# Patient Record
Sex: MALE | Race: WHITE | NOT HISPANIC OR LATINO | Employment: OTHER | URBAN - METROPOLITAN AREA
[De-identification: names, ages, dates, MRNs, and addresses within clinical notes are randomized per-mention and may not be internally consistent; named-entity substitution may affect disease eponyms.]

---

## 2017-01-01 ENCOUNTER — APPOINTMENT (INPATIENT)
Dept: RADIOLOGY | Facility: HOSPITAL | Age: 72
DRG: 356 | End: 2017-01-01
Payer: MEDICARE

## 2017-01-01 PROCEDURE — 71260 CT THORAX DX C+: CPT

## 2017-01-03 ENCOUNTER — APPOINTMENT (INPATIENT)
Dept: RADIOLOGY | Facility: HOSPITAL | Age: 72
DRG: 356 | End: 2017-01-03
Payer: MEDICARE

## 2017-01-03 ENCOUNTER — ANESTHESIA EVENT (INPATIENT)
Dept: GASTROENTEROLOGY | Facility: HOSPITAL | Age: 72
DRG: 356 | End: 2017-01-03
Payer: MEDICARE

## 2017-01-03 ENCOUNTER — ANESTHESIA (INPATIENT)
Dept: GASTROENTEROLOGY | Facility: HOSPITAL | Age: 72
DRG: 356 | End: 2017-01-03
Payer: MEDICARE

## 2017-01-03 PROCEDURE — 74020 HB X-RAY EXAM OF ABDOMEN (COMPLETE, WITH DECUBITUS/ERECT VIEWS): CPT

## 2017-01-03 PROCEDURE — 71020 HB CHEST X-RAY 2VW FRONTAL&LATL: CPT

## 2017-01-03 RX ORDER — EPHEDRINE SULFATE 50 MG/ML
INJECTION, SOLUTION INTRAVENOUS AS NEEDED
Status: DISCONTINUED | OUTPATIENT
Start: 2017-01-03 | End: 2017-01-03 | Stop reason: SURG

## 2017-01-03 RX ORDER — PROPOFOL 10 MG/ML
INJECTION, EMULSION INTRAVENOUS AS NEEDED
Status: DISCONTINUED | OUTPATIENT
Start: 2017-01-03 | End: 2017-01-03 | Stop reason: SURG

## 2017-01-03 RX ORDER — PROPOFOL 10 MG/ML
INJECTION, EMULSION INTRAVENOUS CONTINUOUS PRN
Status: DISCONTINUED | OUTPATIENT
Start: 2017-01-03 | End: 2017-01-03 | Stop reason: SURG

## 2017-01-03 RX ADMIN — PROPOFOL 150 MCG/KG/MIN: 10 INJECTION, EMULSION INTRAVENOUS at 15:55

## 2017-01-03 RX ADMIN — EPHEDRINE SULFATE 20 MG: 50 INJECTION, SOLUTION INTRAMUSCULAR; INTRAVENOUS; SUBCUTANEOUS at 16:52

## 2017-01-03 RX ADMIN — PROPOFOL 80 MG: 10 INJECTION, EMULSION INTRAVENOUS at 15:55

## 2017-01-04 ENCOUNTER — ANESTHESIA EVENT (INPATIENT)
Dept: PERIOP | Facility: HOSPITAL | Age: 72
DRG: 356 | End: 2017-01-04
Payer: MEDICARE

## 2017-01-04 ENCOUNTER — ANESTHESIA EVENT (INPATIENT)
Dept: GASTROENTEROLOGY | Facility: HOSPITAL | Age: 72
DRG: 356 | End: 2017-01-04
Payer: MEDICARE

## 2017-01-04 ENCOUNTER — ANESTHESIA (INPATIENT)
Dept: GASTROENTEROLOGY | Facility: HOSPITAL | Age: 72
DRG: 356 | End: 2017-01-04
Payer: MEDICARE

## 2017-01-04 ENCOUNTER — APPOINTMENT (INPATIENT)
Dept: RADIOLOGY | Facility: HOSPITAL | Age: 72
DRG: 356 | End: 2017-01-04
Payer: MEDICARE

## 2017-01-04 ENCOUNTER — GENERIC CONVERSION - ENCOUNTER (OUTPATIENT)
Dept: OTHER | Facility: OTHER | Age: 72
End: 2017-01-04

## 2017-01-04 PROCEDURE — 74280 X-RAY XM COLON 2CNTRST STD: CPT

## 2017-01-04 RX ORDER — KETAMINE HYDROCHLORIDE 100 MG/ML
INJECTION, SOLUTION INTRAMUSCULAR; INTRAVENOUS AS NEEDED
Status: DISCONTINUED | OUTPATIENT
Start: 2017-01-04 | End: 2017-01-04 | Stop reason: SURG

## 2017-01-04 RX ORDER — PROPOFOL 10 MG/ML
INJECTION, EMULSION INTRAVENOUS AS NEEDED
Status: DISCONTINUED | OUTPATIENT
Start: 2017-01-04 | End: 2017-01-04 | Stop reason: SURG

## 2017-01-04 RX ORDER — GLYCOPYRROLATE 0.2 MG/ML
INJECTION INTRAMUSCULAR; INTRAVENOUS AS NEEDED
Status: DISCONTINUED | OUTPATIENT
Start: 2017-01-04 | End: 2017-01-04 | Stop reason: SURG

## 2017-01-04 RX ADMIN — PROPOFOL 40 MG: 10 INJECTION, EMULSION INTRAVENOUS at 12:57

## 2017-01-04 RX ADMIN — PROPOFOL 30 MG: 10 INJECTION, EMULSION INTRAVENOUS at 12:47

## 2017-01-04 RX ADMIN — PROPOFOL 40 MG: 10 INJECTION, EMULSION INTRAVENOUS at 13:12

## 2017-01-04 RX ADMIN — PROPOFOL 40 MG: 10 INJECTION, EMULSION INTRAVENOUS at 13:06

## 2017-01-04 RX ADMIN — GLYCOPYRROLATE 0.2 MG: 0.2 INJECTION INTRAMUSCULAR; INTRAVENOUS at 12:37

## 2017-01-04 RX ADMIN — KETAMINE HYDROCHLORIDE 10 MG: 100 INJECTION INTRAMUSCULAR; INTRAVENOUS at 12:53

## 2017-01-04 RX ADMIN — PROPOFOL 80 MG: 10 INJECTION, EMULSION INTRAVENOUS at 12:41

## 2017-01-04 RX ADMIN — PROPOFOL 20 MG: 10 INJECTION, EMULSION INTRAVENOUS at 12:45

## 2017-01-04 RX ADMIN — PROPOFOL 30 MG: 10 INJECTION, EMULSION INTRAVENOUS at 12:53

## 2017-01-04 RX ADMIN — KETAMINE HYDROCHLORIDE 20 MG: 100 INJECTION INTRAMUSCULAR; INTRAVENOUS at 12:40

## 2017-01-05 ENCOUNTER — ANESTHESIA (INPATIENT)
Dept: PERIOP | Facility: HOSPITAL | Age: 72
DRG: 356 | End: 2017-01-05
Payer: MEDICARE

## 2017-01-05 RX ORDER — ROCURONIUM BROMIDE 10 MG/ML
INJECTION, SOLUTION INTRAVENOUS AS NEEDED
Status: DISCONTINUED | OUTPATIENT
Start: 2017-01-05 | End: 2017-01-05 | Stop reason: SURG

## 2017-01-05 RX ORDER — SODIUM CHLORIDE 9 MG/ML
INJECTION, SOLUTION INTRAVENOUS CONTINUOUS PRN
Status: DISCONTINUED | OUTPATIENT
Start: 2017-01-05 | End: 2017-01-05 | Stop reason: SURG

## 2017-01-05 RX ORDER — HYDROMORPHONE HYDROCHLORIDE 2 MG/ML
INJECTION, SOLUTION INTRAMUSCULAR; INTRAVENOUS; SUBCUTANEOUS AS NEEDED
Status: DISCONTINUED | OUTPATIENT
Start: 2017-01-05 | End: 2017-01-05 | Stop reason: SURG

## 2017-01-05 RX ORDER — SODIUM CHLORIDE, SODIUM LACTATE, POTASSIUM CHLORIDE, CALCIUM CHLORIDE 600; 310; 30; 20 MG/100ML; MG/100ML; MG/100ML; MG/100ML
INJECTION, SOLUTION INTRAVENOUS CONTINUOUS PRN
Status: DISCONTINUED | OUTPATIENT
Start: 2017-01-05 | End: 2017-01-05

## 2017-01-05 RX ORDER — FENTANYL CITRATE 50 UG/ML
INJECTION, SOLUTION INTRAMUSCULAR; INTRAVENOUS AS NEEDED
Status: DISCONTINUED | OUTPATIENT
Start: 2017-01-05 | End: 2017-01-05 | Stop reason: SURG

## 2017-01-05 RX ORDER — GLYCOPYRROLATE 0.2 MG/ML
INJECTION INTRAMUSCULAR; INTRAVENOUS AS NEEDED
Status: DISCONTINUED | OUTPATIENT
Start: 2017-01-05 | End: 2017-01-05 | Stop reason: SURG

## 2017-01-05 RX ORDER — PROPOFOL 10 MG/ML
INJECTION, EMULSION INTRAVENOUS AS NEEDED
Status: DISCONTINUED | OUTPATIENT
Start: 2017-01-05 | End: 2017-01-05 | Stop reason: SURG

## 2017-01-05 RX ORDER — SUCCINYLCHOLINE CHLORIDE 20 MG/ML
INJECTION INTRAMUSCULAR; INTRAVENOUS AS NEEDED
Status: DISCONTINUED | OUTPATIENT
Start: 2017-01-05 | End: 2017-01-05 | Stop reason: SURG

## 2017-01-05 RX ORDER — ALBUMIN, HUMAN INJ 5% 5 %
SOLUTION INTRAVENOUS CONTINUOUS PRN
Status: DISCONTINUED | OUTPATIENT
Start: 2017-01-05 | End: 2017-01-05 | Stop reason: SURG

## 2017-01-05 RX ORDER — ONDANSETRON 2 MG/ML
INJECTION INTRAMUSCULAR; INTRAVENOUS AS NEEDED
Status: DISCONTINUED | OUTPATIENT
Start: 2017-01-05 | End: 2017-01-05 | Stop reason: SURG

## 2017-01-05 RX ORDER — SODIUM CHLORIDE, SODIUM LACTATE, POTASSIUM CHLORIDE, CALCIUM CHLORIDE 600; 310; 30; 20 MG/100ML; MG/100ML; MG/100ML; MG/100ML
INJECTION, SOLUTION INTRAVENOUS CONTINUOUS PRN
Status: DISCONTINUED | OUTPATIENT
Start: 2017-01-05 | End: 2017-01-05 | Stop reason: SURG

## 2017-01-05 RX ADMIN — ROCURONIUM BROMIDE 50 MG: 10 INJECTION, SOLUTION INTRAVENOUS at 18:55

## 2017-01-05 RX ADMIN — FENTANYL CITRATE 50 MCG: 50 INJECTION, SOLUTION INTRAMUSCULAR; INTRAVENOUS at 19:40

## 2017-01-05 RX ADMIN — ONDANSETRON 4 MG: 2 INJECTION INTRAMUSCULAR; INTRAVENOUS at 21:00

## 2017-01-05 RX ADMIN — HYDROMORPHONE HYDROCHLORIDE 0.5 MG: 2 INJECTION, SOLUTION INTRAMUSCULAR; INTRAVENOUS; SUBCUTANEOUS at 21:25

## 2017-01-05 RX ADMIN — METRONIDAZOLE 500 MG: 500 INJECTION, SOLUTION INTRAVENOUS at 18:55

## 2017-01-05 RX ADMIN — GLYCOPYRROLATE 0.4 MG: 0.2 INJECTION INTRAMUSCULAR; INTRAVENOUS at 21:09

## 2017-01-05 RX ADMIN — KETAMINE HYDROCHLORIDE 0.2 MG/KG/HR: 100 INJECTION INTRAMUSCULAR; INTRAVENOUS at 19:10

## 2017-01-05 RX ADMIN — ROCURONIUM BROMIDE 20 MG: 10 INJECTION, SOLUTION INTRAVENOUS at 20:01

## 2017-01-05 RX ADMIN — ROCURONIUM BROMIDE 20 MG: 10 INJECTION, SOLUTION INTRAVENOUS at 19:28

## 2017-01-05 RX ADMIN — SODIUM CHLORIDE, SODIUM LACTATE, POTASSIUM CHLORIDE, AND CALCIUM CHLORIDE: .6; .31; .03; .02 INJECTION, SOLUTION INTRAVENOUS at 18:30

## 2017-01-05 RX ADMIN — NEOSTIGMINE METHYLSULFATE 3 MG: 1 INJECTION INTRAMUSCULAR; INTRAVENOUS; SUBCUTANEOUS at 21:09

## 2017-01-05 RX ADMIN — SODIUM CHLORIDE: 0.9 INJECTION, SOLUTION INTRAVENOUS at 18:42

## 2017-01-05 RX ADMIN — SUCCINYLCHOLINE CHLORIDE 100 MG: 20 INJECTION, SOLUTION INTRAMUSCULAR; INTRAVENOUS at 18:39

## 2017-01-05 RX ADMIN — ALBUMIN HUMAN: 0.05 INJECTION, SOLUTION INTRAVENOUS at 20:17

## 2017-01-05 RX ADMIN — PROPOFOL 200 MG: 10 INJECTION, EMULSION INTRAVENOUS at 18:39

## 2017-01-05 RX ADMIN — SODIUM CHLORIDE, SODIUM LACTATE, POTASSIUM CHLORIDE, AND CALCIUM CHLORIDE: .6; .31; .03; .02 INJECTION, SOLUTION INTRAVENOUS at 20:16

## 2017-01-05 RX ADMIN — FENTANYL CITRATE 50 MCG: 50 INJECTION, SOLUTION INTRAMUSCULAR; INTRAVENOUS at 18:39

## 2017-01-05 RX ADMIN — CEFAZOLIN SODIUM 2000 MG: 2 SOLUTION INTRAVENOUS at 18:45

## 2017-01-20 ENCOUNTER — ALLSCRIPTS OFFICE VISIT (OUTPATIENT)
Dept: OTHER | Facility: OTHER | Age: 72
End: 2017-01-20

## 2017-01-24 ENCOUNTER — HOSPITAL ENCOUNTER (OUTPATIENT)
Dept: RADIOLOGY | Facility: MEDICAL CENTER | Age: 72
Discharge: HOME | End: 2017-01-24
Payer: MEDICARE

## 2017-01-24 DIAGNOSIS — R60.0 LOCALIZED EDEMA: ICD-10-CM

## 2017-01-24 PROCEDURE — 93970 EXTREMITY STUDY: CPT

## 2017-02-08 ENCOUNTER — ALLSCRIPTS OFFICE VISIT (OUTPATIENT)
Dept: OTHER | Facility: OTHER | Age: 72
End: 2017-02-08

## 2017-02-08 DIAGNOSIS — C80.1 PRIMARY MALIGNANT NEOPLASM (HCC): ICD-10-CM

## 2017-02-08 DIAGNOSIS — M21.371 RIGHT FOOT DROP: ICD-10-CM

## 2017-02-08 DIAGNOSIS — E11.9 TYPE 2 DIABETES MELLITUS WITHOUT COMPLICATIONS (HCC): ICD-10-CM

## 2017-02-08 DIAGNOSIS — N40.0 ENLARGED PROSTATE WITHOUT LOWER URINARY TRACT SYMPTOMS (LUTS): ICD-10-CM

## 2017-02-10 RX ORDER — SENNA AND DOCUSATE SODIUM 50; 8.6 MG/1; MG/1
1 TABLET, FILM COATED ORAL 2 TIMES DAILY
COMMUNITY

## 2017-02-10 RX ORDER — METFORMIN HYDROCHLORIDE EXTENDED-RELEASE TABLETS 1000 MG/1
500 TABLET, FILM COATED, EXTENDED RELEASE ORAL
COMMUNITY

## 2017-02-10 RX ORDER — POLYETHYLENE GLYCOL 3350 17 G/17G
17 POWDER, FOR SOLUTION ORAL DAILY
COMMUNITY

## 2017-02-13 ENCOUNTER — TRANSCRIBE ORDERS (OUTPATIENT)
Dept: ADMINISTRATIVE | Facility: HOSPITAL | Age: 72
End: 2017-02-13

## 2017-02-13 ENCOUNTER — APPOINTMENT (OUTPATIENT)
Dept: LAB | Facility: HOSPITAL | Age: 72
End: 2017-02-13
Attending: INTERNAL MEDICINE
Payer: MEDICARE

## 2017-02-13 DIAGNOSIS — N40.0 ENLARGED PROSTATE WITHOUT LOWER URINARY TRACT SYMPTOMS (LUTS): ICD-10-CM

## 2017-02-13 DIAGNOSIS — E11.9 TYPE 2 DIABETES MELLITUS WITHOUT COMPLICATIONS (HCC): ICD-10-CM

## 2017-02-13 DIAGNOSIS — C80.1 PRIMARY MALIGNANT NEOPLASM (HCC): ICD-10-CM

## 2017-02-13 LAB
ALBUMIN SERPL BCP-MCNC: 3.2 G/DL (ref 3.5–5)
ALP SERPL-CCNC: 100 U/L (ref 46–116)
ALT SERPL W P-5'-P-CCNC: 20 U/L (ref 12–78)
ANION GAP SERPL CALCULATED.3IONS-SCNC: 7 MMOL/L (ref 4–13)
AST SERPL W P-5'-P-CCNC: 8 U/L (ref 5–45)
BASOPHILS # BLD AUTO: 0.03 THOUSANDS/ΜL (ref 0–0.1)
BASOPHILS NFR BLD AUTO: 1 % (ref 0–1)
BILIRUB SERPL-MCNC: 0.9 MG/DL (ref 0.2–1)
BUN SERPL-MCNC: 17 MG/DL (ref 5–25)
CALCIUM SERPL-MCNC: 9.9 MG/DL (ref 8.3–10.1)
CHLORIDE SERPL-SCNC: 103 MMOL/L (ref 100–108)
CO2 SERPL-SCNC: 31 MMOL/L (ref 21–32)
CREAT SERPL-MCNC: 1.08 MG/DL (ref 0.6–1.3)
EOSINOPHIL # BLD AUTO: 0.12 THOUSAND/ΜL (ref 0–0.61)
EOSINOPHIL NFR BLD AUTO: 2 % (ref 0–6)
ERYTHROCYTE [DISTWIDTH] IN BLOOD BY AUTOMATED COUNT: 17.7 % (ref 11.6–15.1)
GFR SERPL CREATININE-BSD FRML MDRD: >60 ML/MIN/1.73SQ M
GLUCOSE SERPL-MCNC: 124 MG/DL (ref 65–140)
HCT VFR BLD AUTO: 41.8 % (ref 36.5–49.3)
HGB BLD-MCNC: 12.9 G/DL (ref 12–17)
LYMPHOCYTES # BLD AUTO: 1.5 THOUSANDS/ΜL (ref 0.6–4.47)
LYMPHOCYTES NFR BLD AUTO: 28 % (ref 14–44)
MCH RBC QN AUTO: 24.8 PG (ref 26.8–34.3)
MCHC RBC AUTO-ENTMCNC: 30.9 G/DL (ref 31.4–37.4)
MCV RBC AUTO: 80 FL (ref 82–98)
MONOCYTES # BLD AUTO: 0.55 THOUSAND/ΜL (ref 0.17–1.22)
MONOCYTES NFR BLD AUTO: 10 % (ref 4–12)
NEUTROPHILS # BLD AUTO: 3.24 THOUSANDS/ΜL (ref 1.85–7.62)
NEUTS SEG NFR BLD AUTO: 59 % (ref 43–75)
NRBC BLD AUTO-RTO: 0 /100 WBCS
PLATELET # BLD AUTO: 260 THOUSANDS/UL (ref 149–390)
PMV BLD AUTO: 9.8 FL (ref 8.9–12.7)
POTASSIUM SERPL-SCNC: 3.8 MMOL/L (ref 3.5–5.3)
PROT SERPL-MCNC: 7.6 G/DL (ref 6.4–8.2)
PSA SERPL-MCNC: 8.2 NG/ML (ref 0–4)
RBC # BLD AUTO: 5.2 MILLION/UL (ref 3.88–5.62)
SODIUM SERPL-SCNC: 141 MMOL/L (ref 136–145)
WBC # BLD AUTO: 5.45 THOUSAND/UL (ref 4.31–10.16)

## 2017-02-13 PROCEDURE — 85025 COMPLETE CBC W/AUTO DIFF WBC: CPT

## 2017-02-13 PROCEDURE — 80053 COMPREHEN METABOLIC PANEL: CPT

## 2017-02-13 PROCEDURE — 36415 COLL VENOUS BLD VENIPUNCTURE: CPT

## 2017-02-13 PROCEDURE — 84153 ASSAY OF PSA TOTAL: CPT

## 2017-02-13 PROCEDURE — 86301 IMMUNOASSAY TUMOR CA 19-9: CPT

## 2017-02-14 ENCOUNTER — APPOINTMENT (OUTPATIENT)
Dept: RADIOLOGY | Facility: HOSPITAL | Age: 72
End: 2017-02-14
Attending: COLON & RECTAL SURGERY
Payer: MEDICARE

## 2017-02-14 ENCOUNTER — APPOINTMENT (OUTPATIENT)
Dept: RADIOLOGY | Facility: HOSPITAL | Age: 72
End: 2017-02-14
Payer: MEDICARE

## 2017-02-14 ENCOUNTER — ANESTHESIA (OUTPATIENT)
Dept: PERIOP | Facility: HOSPITAL | Age: 72
End: 2017-02-14
Payer: MEDICARE

## 2017-02-14 ENCOUNTER — HOSPITAL ENCOUNTER (OUTPATIENT)
Facility: HOSPITAL | Age: 72
Setting detail: OUTPATIENT SURGERY
Discharge: HOME | End: 2017-02-14
Attending: COLON & RECTAL SURGERY | Admitting: COLON & RECTAL SURGERY
Payer: MEDICARE

## 2017-02-14 ENCOUNTER — ANESTHESIA EVENT (OUTPATIENT)
Dept: PERIOP | Facility: HOSPITAL | Age: 72
End: 2017-02-14
Payer: MEDICARE

## 2017-02-14 VITALS
TEMPERATURE: 98.4 F | DIASTOLIC BLOOD PRESSURE: 75 MMHG | OXYGEN SATURATION: 93 % | HEART RATE: 53 BPM | HEIGHT: 75 IN | WEIGHT: 240 LBS | RESPIRATION RATE: 16 BRPM | SYSTOLIC BLOOD PRESSURE: 112 MMHG | BODY MASS INDEX: 29.84 KG/M2

## 2017-02-14 DIAGNOSIS — K63.89 COLONIC MASS: Primary | ICD-10-CM

## 2017-02-14 LAB
BACTERIA UR QL AUTO: ABNORMAL /HPF
BILIRUB UR QL STRIP: ABNORMAL
CANCER AG19-9 SERPL-ACNC: 10 U/ML (ref 0–35)
CAOX CRY URNS QL MICRO: ABNORMAL /HPF
CLARITY UR: ABNORMAL
COLOR UR: ABNORMAL
GLUCOSE SERPL-MCNC: 122 MG/DL (ref 65–140)
GLUCOSE SERPL-MCNC: 135 MG/DL (ref 65–140)
GLUCOSE UR STRIP-MCNC: NEGATIVE MG/DL
HGB UR QL STRIP.AUTO: NEGATIVE
HYALINE CASTS #/AREA URNS LPF: ABNORMAL /LPF
KETONES UR STRIP-MCNC: NEGATIVE MG/DL
LEUKOCYTE ESTERASE UR QL STRIP: ABNORMAL
NITRITE UR QL STRIP: NEGATIVE
NON-SQ EPI CELLS URNS QL MICRO: ABNORMAL /HPF
PH UR STRIP.AUTO: 6 [PH] (ref 4.5–8)
PROT UR STRIP-MCNC: ABNORMAL MG/DL
RBC #/AREA URNS AUTO: ABNORMAL /HPF
SP GR UR STRIP.AUTO: 1.02 (ref 1–1.03)
UROBILINOGEN UR QL STRIP.AUTO: 2 E.U./DL
WBC #/AREA URNS AUTO: ABNORMAL /HPF

## 2017-02-14 PROCEDURE — 81001 URINALYSIS AUTO W/SCOPE: CPT | Performed by: COLON & RECTAL SURGERY

## 2017-02-14 PROCEDURE — 77001 FLUOROGUIDE FOR VEIN DEVICE: CPT

## 2017-02-14 PROCEDURE — 82948 REAGENT STRIP/BLOOD GLUCOSE: CPT

## 2017-02-14 PROCEDURE — C1788 PORT, INDWELLING, IMP: HCPCS | Performed by: COLON & RECTAL SURGERY

## 2017-02-14 PROCEDURE — 71010 HB CHEST X-RAY 1 VIEW FRONTAL (PORTABLE): CPT

## 2017-02-14 DEVICE — PORT HIGH PROFILE 8FR KIT PRO-FUSE: Type: IMPLANTABLE DEVICE | Site: CHEST | Status: FUNCTIONAL

## 2017-02-14 RX ORDER — FENTANYL CITRATE/PF 50 MCG/ML
25 SYRINGE (ML) INJECTION
Status: DISCONTINUED | OUTPATIENT
Start: 2017-02-14 | End: 2017-02-14 | Stop reason: HOSPADM

## 2017-02-14 RX ORDER — ATROPINE SULFATE 1 MG/ML
0.25 INJECTION, SOLUTION INTRAMUSCULAR; INTRAVENOUS; SUBCUTANEOUS ONCE
Status: COMPLETED | OUTPATIENT
Start: 2017-02-15 | End: 2017-02-15

## 2017-02-14 RX ORDER — FENTANYL CITRATE 50 UG/ML
INJECTION, SOLUTION INTRAMUSCULAR; INTRAVENOUS AS NEEDED
Status: DISCONTINUED | OUTPATIENT
Start: 2017-02-14 | End: 2017-02-14 | Stop reason: SURG

## 2017-02-14 RX ORDER — ONDANSETRON 2 MG/ML
4 INJECTION INTRAMUSCULAR; INTRAVENOUS EVERY 4 HOURS PRN
Status: DISCONTINUED | OUTPATIENT
Start: 2017-02-14 | End: 2017-02-14 | Stop reason: HOSPADM

## 2017-02-14 RX ORDER — LIDOCAINE HYDROCHLORIDE 10 MG/ML
INJECTION, SOLUTION INFILTRATION; PERINEURAL AS NEEDED
Status: DISCONTINUED | OUTPATIENT
Start: 2017-02-14 | End: 2017-02-14 | Stop reason: HOSPADM

## 2017-02-14 RX ORDER — SODIUM CHLORIDE, SODIUM LACTATE, POTASSIUM CHLORIDE, CALCIUM CHLORIDE 600; 310; 30; 20 MG/100ML; MG/100ML; MG/100ML; MG/100ML
50 INJECTION, SOLUTION INTRAVENOUS CONTINUOUS
Status: DISCONTINUED | OUTPATIENT
Start: 2017-02-14 | End: 2017-02-14 | Stop reason: HOSPADM

## 2017-02-14 RX ORDER — PROPOFOL 10 MG/ML
INJECTION, EMULSION INTRAVENOUS CONTINUOUS PRN
Status: DISCONTINUED | OUTPATIENT
Start: 2017-02-14 | End: 2017-02-14 | Stop reason: SURG

## 2017-02-14 RX ADMIN — FENTANYL CITRATE 25 MCG: 50 INJECTION, SOLUTION INTRAMUSCULAR; INTRAVENOUS at 08:26

## 2017-02-14 RX ADMIN — FENTANYL CITRATE 50 MCG: 50 INJECTION, SOLUTION INTRAMUSCULAR; INTRAVENOUS at 07:33

## 2017-02-14 RX ADMIN — PROPOFOL 100 MCG/KG/MIN: 10 INJECTION, EMULSION INTRAVENOUS at 07:34

## 2017-02-14 RX ADMIN — SODIUM CHLORIDE, SODIUM LACTATE, POTASSIUM CHLORIDE, AND CALCIUM CHLORIDE: .6; .31; .03; .02 INJECTION, SOLUTION INTRAVENOUS at 07:30

## 2017-02-14 RX ADMIN — FENTANYL CITRATE 25 MCG: 50 INJECTION, SOLUTION INTRAMUSCULAR; INTRAVENOUS at 08:11

## 2017-02-14 RX ADMIN — CEFAZOLIN SODIUM 2000 MG: 2 SOLUTION INTRAVENOUS at 07:37

## 2017-02-14 RX ADMIN — METOPROLOL TARTRATE 1 MG: 5 INJECTION, SOLUTION INTRAVENOUS at 08:30

## 2017-02-15 ENCOUNTER — GENERIC CONVERSION - ENCOUNTER (OUTPATIENT)
Dept: OTHER | Facility: OTHER | Age: 72
End: 2017-02-15

## 2017-02-15 ENCOUNTER — HOSPITAL ENCOUNTER (OUTPATIENT)
Dept: INFUSION CENTER | Facility: HOSPITAL | Age: 72
Discharge: HOME | End: 2017-02-15
Payer: MEDICARE

## 2017-02-15 VITALS
HEIGHT: 75 IN | DIASTOLIC BLOOD PRESSURE: 75 MMHG | SYSTOLIC BLOOD PRESSURE: 131 MMHG | WEIGHT: 228.4 LBS | TEMPERATURE: 97.2 F | HEART RATE: 72 BPM | BODY MASS INDEX: 28.4 KG/M2 | RESPIRATION RATE: 18 BRPM

## 2017-02-15 PROCEDURE — 96413 CHEMO IV INFUSION 1 HR: CPT

## 2017-02-15 PROCEDURE — 96415 CHEMO IV INFUSION ADDL HR: CPT

## 2017-02-15 PROCEDURE — G0498 CHEMO EXTEND IV INFUS W/PUMP: HCPCS

## 2017-02-15 PROCEDURE — 96375 TX/PRO/DX INJ NEW DRUG ADDON: CPT

## 2017-02-15 PROCEDURE — 96367 TX/PROPH/DG ADDL SEQ IV INF: CPT

## 2017-02-15 RX ORDER — OMEPRAZOLE 20 MG/1
20 CAPSULE, DELAYED RELEASE ORAL DAILY
COMMUNITY

## 2017-02-15 RX ADMIN — DEXAMETHASONE SODIUM PHOSPHATE: 10 INJECTION, SOLUTION INTRAMUSCULAR; INTRAVENOUS at 08:13

## 2017-02-15 RX ADMIN — IRINOTECAN HYDROCHLORIDE 330 MG: 100 INJECTION, SOLUTION INTRAVENOUS at 08:54

## 2017-02-15 RX ADMIN — ATROPINE SULFATE 0.25 MG: 1 INJECTION, SOLUTION INTRAMUSCULAR; INTRAVENOUS; SUBCUTANEOUS at 08:55

## 2017-02-15 NOTE — PLAN OF CARE
Problem: Potential for Falls  Goal: Patient will remain free of falls  INTERVENTIONS:  - Assess patient frequently for physical needs  - Identify cognitive and physical deficits and behaviors that affect risk of falls    - [place] fall precautions as indicated by assessment   - Educate patient/family on patient safety including physical limitations  - Instruct patient to call for assistance with activity based on assessment  - Modify environment to reduce risk of injury  - Consider OT/PT consult to assist with strengthening/mobility   Outcome: Progressing

## 2017-02-15 NOTE — SOCIAL WORK
LSW reviewed pts distress thermometer completed by pt on 2/15/2017 in [facility]  Pt rated their distress a 4/10 and denied psychosocial problems  Based on pts score, a social work follow up would not be indicated  If pt expresses psychosocial needs, LSW is available to provide cancer care counseling

## 2017-02-17 ENCOUNTER — HOSPITAL ENCOUNTER (OUTPATIENT)
Dept: INFUSION CENTER | Facility: HOSPITAL | Age: 72
Discharge: HOME | End: 2017-02-17
Payer: MEDICARE

## 2017-02-17 RX ADMIN — Medication 300 UNITS: at 09:12

## 2017-02-21 ENCOUNTER — GENERIC CONVERSION - ENCOUNTER (OUTPATIENT)
Dept: OTHER | Facility: OTHER | Age: 72
End: 2017-02-21

## 2017-02-22 ENCOUNTER — GENERIC CONVERSION - ENCOUNTER (OUTPATIENT)
Dept: OTHER | Facility: OTHER | Age: 72
End: 2017-02-22

## 2017-02-22 ENCOUNTER — ALLSCRIPTS OFFICE VISIT (OUTPATIENT)
Dept: OTHER | Facility: OTHER | Age: 72
End: 2017-02-22

## 2017-02-22 ENCOUNTER — TRANSCRIBE ORDERS (OUTPATIENT)
Dept: ADMINISTRATIVE | Facility: HOSPITAL | Age: 72
End: 2017-02-22

## 2017-02-22 DIAGNOSIS — C80.1 OTHER MALIGNANT NEOPLASM WITHOUT SPECIFICATION OF SITE: Primary | ICD-10-CM

## 2017-02-27 ENCOUNTER — APPOINTMENT (OUTPATIENT)
Dept: LAB | Facility: HOSPITAL | Age: 72
End: 2017-02-27
Attending: INTERNAL MEDICINE
Payer: MEDICARE

## 2017-02-27 DIAGNOSIS — C80.1 PRIMARY MALIGNANT NEOPLASM (HCC): ICD-10-CM

## 2017-02-27 LAB
ALBUMIN SERPL BCP-MCNC: 2.8 G/DL (ref 3.5–5)
ALP SERPL-CCNC: 91 U/L (ref 46–116)
ALT SERPL W P-5'-P-CCNC: 24 U/L (ref 12–78)
ANION GAP SERPL CALCULATED.3IONS-SCNC: 4 MMOL/L (ref 4–13)
AST SERPL W P-5'-P-CCNC: 14 U/L (ref 5–45)
BASOPHILS # BLD AUTO: 0.03 THOUSANDS/ΜL (ref 0–0.1)
BASOPHILS NFR BLD AUTO: 1 % (ref 0–1)
BILIRUB SERPL-MCNC: 0.5 MG/DL (ref 0.2–1)
BUN SERPL-MCNC: 13 MG/DL (ref 5–25)
CALCIUM SERPL-MCNC: 9.7 MG/DL (ref 8.3–10.1)
CHLORIDE SERPL-SCNC: 104 MMOL/L (ref 100–108)
CO2 SERPL-SCNC: 32 MMOL/L (ref 21–32)
CREAT SERPL-MCNC: 0.95 MG/DL (ref 0.6–1.3)
EOSINOPHIL # BLD AUTO: 0.11 THOUSAND/ΜL (ref 0–0.61)
EOSINOPHIL NFR BLD AUTO: 2 % (ref 0–6)
ERYTHROCYTE [DISTWIDTH] IN BLOOD BY AUTOMATED COUNT: 17.2 % (ref 11.6–15.1)
GFR SERPL CREATININE-BSD FRML MDRD: >60 ML/MIN/1.73SQ M
GLUCOSE SERPL-MCNC: 117 MG/DL (ref 65–140)
HCT VFR BLD AUTO: 41.5 % (ref 36.5–49.3)
HGB BLD-MCNC: 13.1 G/DL (ref 12–17)
LYMPHOCYTES # BLD AUTO: 1.34 THOUSANDS/ΜL (ref 0.6–4.47)
LYMPHOCYTES NFR BLD AUTO: 27 % (ref 14–44)
MCH RBC QN AUTO: 25.5 PG (ref 26.8–34.3)
MCHC RBC AUTO-ENTMCNC: 31.6 G/DL (ref 31.4–37.4)
MCV RBC AUTO: 81 FL (ref 82–98)
MONOCYTES # BLD AUTO: 0.4 THOUSAND/ΜL (ref 0.17–1.22)
MONOCYTES NFR BLD AUTO: 8 % (ref 4–12)
NEUTROPHILS # BLD AUTO: 3.16 THOUSANDS/ΜL (ref 1.85–7.62)
NEUTS SEG NFR BLD AUTO: 63 % (ref 43–75)
NRBC BLD AUTO-RTO: 0 /100 WBCS
PLATELET # BLD AUTO: 257 THOUSANDS/UL (ref 149–390)
PMV BLD AUTO: 9.6 FL (ref 8.9–12.7)
POTASSIUM SERPL-SCNC: 3.8 MMOL/L (ref 3.5–5.3)
PROT SERPL-MCNC: 7 G/DL (ref 6.4–8.2)
RBC # BLD AUTO: 5.14 MILLION/UL (ref 3.88–5.62)
SODIUM SERPL-SCNC: 140 MMOL/L (ref 136–145)
WBC # BLD AUTO: 5.05 THOUSAND/UL (ref 4.31–10.16)

## 2017-02-27 PROCEDURE — 36415 COLL VENOUS BLD VENIPUNCTURE: CPT

## 2017-02-27 PROCEDURE — 80053 COMPREHEN METABOLIC PANEL: CPT

## 2017-02-27 PROCEDURE — 85025 COMPLETE CBC W/AUTO DIFF WBC: CPT

## 2017-02-28 RX ORDER — ATROPINE SULFATE 1 MG/ML
0.25 INJECTION, SOLUTION INTRAMUSCULAR; INTRAVENOUS; SUBCUTANEOUS ONCE
Status: COMPLETED | OUTPATIENT
Start: 2017-03-01 | End: 2017-03-01

## 2017-03-01 ENCOUNTER — HOSPITAL ENCOUNTER (OUTPATIENT)
Dept: INFUSION CENTER | Facility: HOSPITAL | Age: 72
Discharge: HOME | End: 2017-03-01
Payer: MEDICARE

## 2017-03-01 VITALS
HEIGHT: 75 IN | DIASTOLIC BLOOD PRESSURE: 68 MMHG | SYSTOLIC BLOOD PRESSURE: 109 MMHG | BODY MASS INDEX: 26.75 KG/M2 | TEMPERATURE: 97.6 F | RESPIRATION RATE: 20 BRPM | WEIGHT: 215.17 LBS | HEART RATE: 62 BPM

## 2017-03-01 PROCEDURE — 96375 TX/PRO/DX INJ NEW DRUG ADDON: CPT

## 2017-03-01 PROCEDURE — 96367 TX/PROPH/DG ADDL SEQ IV INF: CPT

## 2017-03-01 PROCEDURE — 96413 CHEMO IV INFUSION 1 HR: CPT

## 2017-03-01 PROCEDURE — G0498 CHEMO EXTEND IV INFUS W/PUMP: HCPCS

## 2017-03-01 PROCEDURE — 96415 CHEMO IV INFUSION ADDL HR: CPT

## 2017-03-01 RX ADMIN — IRINOTECAN HYDROCHLORIDE 330 MG: 100 INJECTION, SOLUTION INTRAVENOUS at 14:01

## 2017-03-01 RX ADMIN — DEXAMETHASONE SODIUM PHOSPHATE: 10 INJECTION, SOLUTION INTRAMUSCULAR; INTRAVENOUS at 13:05

## 2017-03-01 RX ADMIN — ATROPINE SULFATE 0.25 MG: 1 INJECTION, SOLUTION INTRAMUSCULAR; INTRAVENOUS; SUBCUTANEOUS at 14:00

## 2017-03-03 ENCOUNTER — HOSPITAL ENCOUNTER (OUTPATIENT)
Dept: INFUSION CENTER | Facility: HOSPITAL | Age: 72
Discharge: HOME | End: 2017-03-03
Payer: MEDICARE

## 2017-03-03 VITALS — DIASTOLIC BLOOD PRESSURE: 66 MMHG | SYSTOLIC BLOOD PRESSURE: 100 MMHG

## 2017-03-03 RX ADMIN — Medication 300 UNITS: at 13:46

## 2017-03-03 NOTE — PROGRESS NOTES
Pt complained of intermittant dizzyness that he feels is related to his bp meds with he independantly adjusts  /60    Advised pt to follow up with his family dr

## 2017-03-03 NOTE — PLAN OF CARE
Problem: Potential for Falls  Goal: Patient will remain free of falls  INTERVENTIONS:  - Assess patient frequently for physical needs  - Identify cognitive and physical deficits and behaviors that affect risk of falls    - [place] fall precautions as indicated by assessment   - Educate patient/family on patient safety including physical limitations  - Instruct patient to call for assistance with activity based on assessment  - Modify environment to reduce risk of injury  - Consider OT/PT consult to assist with strengthening/mobility   Outcome: Progressing

## 2017-03-08 ENCOUNTER — HOSPITAL ENCOUNTER (OUTPATIENT)
Dept: MRI IMAGING | Facility: HOSPITAL | Age: 72
Discharge: HOME | End: 2017-03-08
Payer: MEDICARE

## 2017-03-08 DIAGNOSIS — C80.1 OTHER MALIGNANT NEOPLASM WITHOUT SPECIFICATION OF SITE: ICD-10-CM

## 2017-03-08 PROCEDURE — 72158 MRI LUMBAR SPINE W/O & W/DYE: CPT

## 2017-03-08 PROCEDURE — A9585 GADOBUTROL INJECTION: HCPCS | Performed by: RADIOLOGY

## 2017-03-08 RX ADMIN — GADOBUTROL 10 ML: 604.72 INJECTION INTRAVENOUS at 10:29

## 2017-03-13 ENCOUNTER — TRANSCRIBE ORDERS (OUTPATIENT)
Dept: LAB | Facility: HOSPITAL | Age: 72
End: 2017-03-13

## 2017-03-13 DIAGNOSIS — R60.0 LOCALIZED EDEMA: ICD-10-CM

## 2017-03-13 DIAGNOSIS — C80.1 PRIMARY MALIGNANT NEOPLASM (HCC): ICD-10-CM

## 2017-03-13 DIAGNOSIS — C80.1 OTHER MALIGNANT NEOPLASM WITHOUT SPECIFICATION OF SITE: Primary | ICD-10-CM

## 2017-03-15 ENCOUNTER — HOSPITAL ENCOUNTER (OUTPATIENT)
Dept: INFUSION CENTER | Facility: HOSPITAL | Age: 72
Discharge: HOME | End: 2017-03-15
Payer: MEDICARE

## 2017-03-15 RX ORDER — MAGNESIUM SULFATE HEPTAHYDRATE 40 MG/ML
2 INJECTION, SOLUTION INTRAVENOUS ONCE
Status: COMPLETED | OUTPATIENT
Start: 2017-03-16 | End: 2017-03-16

## 2017-03-15 RX ORDER — ATROPINE SULFATE 1 MG/ML
0.25 INJECTION, SOLUTION INTRAMUSCULAR; INTRAVENOUS; SUBCUTANEOUS ONCE
Status: DISCONTINUED | OUTPATIENT
Start: 2017-03-15 | End: 2017-03-16

## 2017-03-16 ENCOUNTER — HOSPITAL ENCOUNTER (OUTPATIENT)
Dept: INFUSION CENTER | Facility: HOSPITAL | Age: 72
Discharge: HOME | End: 2017-03-16
Payer: MEDICARE

## 2017-03-16 VITALS
TEMPERATURE: 97 F | DIASTOLIC BLOOD PRESSURE: 85 MMHG | RESPIRATION RATE: 18 BRPM | SYSTOLIC BLOOD PRESSURE: 121 MMHG | HEIGHT: 75 IN | BODY MASS INDEX: 26.45 KG/M2 | WEIGHT: 212.74 LBS | HEART RATE: 98 BPM

## 2017-03-16 LAB
ALBUMIN SERPL BCP-MCNC: 3.1 G/DL (ref 3.5–5)
ALP SERPL-CCNC: 100 U/L (ref 46–116)
ALT SERPL W P-5'-P-CCNC: 30 U/L (ref 12–78)
ANION GAP SERPL CALCULATED.3IONS-SCNC: 8 MMOL/L (ref 4–13)
AST SERPL W P-5'-P-CCNC: 12 U/L (ref 5–45)
BASOPHILS # BLD AUTO: 0.02 THOUSANDS/ΜL (ref 0–0.1)
BASOPHILS NFR BLD AUTO: 1 % (ref 0–1)
BILIRUB SERPL-MCNC: 0.64 MG/DL (ref 0.2–1)
BUN SERPL-MCNC: 20 MG/DL (ref 5–25)
CALCIUM SERPL-MCNC: 10.2 MG/DL (ref 8.3–10.1)
CHLORIDE SERPL-SCNC: 104 MMOL/L (ref 100–108)
CO2 SERPL-SCNC: 28 MMOL/L (ref 21–32)
CREAT SERPL-MCNC: 1.03 MG/DL (ref 0.6–1.3)
EOSINOPHIL # BLD AUTO: 0.03 THOUSAND/ΜL (ref 0–0.61)
EOSINOPHIL NFR BLD AUTO: 1 % (ref 0–6)
ERYTHROCYTE [DISTWIDTH] IN BLOOD BY AUTOMATED COUNT: 17.4 % (ref 11.6–15.1)
GFR SERPL CREATININE-BSD FRML MDRD: >60 ML/MIN/1.73SQ M
GLUCOSE P FAST SERPL-MCNC: 128 MG/DL (ref 65–99)
GLUCOSE SERPL-MCNC: 128 MG/DL (ref 65–140)
HCT VFR BLD AUTO: 39.9 % (ref 36.5–49.3)
HGB BLD-MCNC: 13.2 G/DL (ref 12–17)
LYMPHOCYTES # BLD AUTO: 1.1 THOUSANDS/ΜL (ref 0.6–4.47)
LYMPHOCYTES NFR BLD AUTO: 27 % (ref 14–44)
MCH RBC QN AUTO: 26.2 PG (ref 26.8–34.3)
MCHC RBC AUTO-ENTMCNC: 33.1 G/DL (ref 31.4–37.4)
MCV RBC AUTO: 79 FL (ref 82–98)
MONOCYTES # BLD AUTO: 0.37 THOUSAND/ΜL (ref 0.17–1.22)
MONOCYTES NFR BLD AUTO: 9 % (ref 4–12)
NEUTROPHILS # BLD AUTO: 2.61 THOUSANDS/ΜL (ref 1.85–7.62)
NEUTS SEG NFR BLD AUTO: 62 % (ref 43–75)
NRBC BLD AUTO-RTO: 0 /100 WBCS
PLATELET # BLD AUTO: 289 THOUSANDS/UL (ref 149–390)
PMV BLD AUTO: 10 FL (ref 8.9–12.7)
POTASSIUM SERPL-SCNC: 3.7 MMOL/L (ref 3.5–5.3)
PROT SERPL-MCNC: 7.3 G/DL (ref 6.4–8.2)
RBC # BLD AUTO: 5.03 MILLION/UL (ref 3.88–5.62)
SODIUM SERPL-SCNC: 140 MMOL/L (ref 136–145)
WBC # BLD AUTO: 4.13 THOUSAND/UL (ref 4.31–10.16)

## 2017-03-16 PROCEDURE — 96413 CHEMO IV INFUSION 1 HR: CPT

## 2017-03-16 PROCEDURE — 80053 COMPREHEN METABOLIC PANEL: CPT | Performed by: INTERNAL MEDICINE

## 2017-03-16 PROCEDURE — 96367 TX/PROPH/DG ADDL SEQ IV INF: CPT

## 2017-03-16 PROCEDURE — 96366 THER/PROPH/DIAG IV INF ADDON: CPT

## 2017-03-16 PROCEDURE — 96417 CHEMO IV INFUS EACH ADDL SEQ: CPT

## 2017-03-16 PROCEDURE — 96372 THER/PROPH/DIAG INJ SC/IM: CPT

## 2017-03-16 PROCEDURE — G0498 CHEMO EXTEND IV INFUS W/PUMP: HCPCS

## 2017-03-16 PROCEDURE — 96415 CHEMO IV INFUSION ADDL HR: CPT

## 2017-03-16 PROCEDURE — 85025 COMPLETE CBC W/AUTO DIFF WBC: CPT | Performed by: INTERNAL MEDICINE

## 2017-03-16 RX ORDER — ATROPINE SULFATE 0.4 MG/ML
0.25 AMPUL (ML) INJECTION ONCE
Status: DISCONTINUED | OUTPATIENT
Start: 2017-03-16 | End: 2017-03-16

## 2017-03-16 RX ORDER — ATROPINE SULFATE 1 MG/ML
0.25 INJECTION, SOLUTION INTRAMUSCULAR; INTRAVENOUS; SUBCUTANEOUS ONCE
Status: COMPLETED | OUTPATIENT
Start: 2017-03-16 | End: 2017-03-16

## 2017-03-16 RX ADMIN — ATROPINE SULFATE 0.25 MG: 1 INJECTION, SOLUTION INTRAMUSCULAR; INTRAVENOUS; SUBCUTANEOUS at 14:43

## 2017-03-16 RX ADMIN — PANITUMUMAB 482 MG: 400 SOLUTION INTRAVENOUS at 13:14

## 2017-03-16 RX ADMIN — MAGNESIUM SULFATE HEPTAHYDRATE 2 G: 40 INJECTION, SOLUTION INTRAVENOUS at 09:51

## 2017-03-16 RX ADMIN — IRINOTECAN HYDROCHLORIDE 315 MG: 100 INJECTION, SOLUTION INTRAVENOUS at 14:46

## 2017-03-16 RX ADMIN — DEXAMETHASONE SODIUM PHOSPHATE: 10 INJECTION, SOLUTION INTRAMUSCULAR; INTRAVENOUS at 12:05

## 2017-03-18 ENCOUNTER — HOSPITAL ENCOUNTER (OUTPATIENT)
Dept: INFUSION CENTER | Facility: HOSPITAL | Age: 72
Discharge: HOME | End: 2017-03-18
Payer: MEDICARE

## 2017-03-18 RX ADMIN — Medication 300 UNITS: at 14:40

## 2017-03-18 NOTE — PLAN OF CARE
Problem: Potential for Falls  Goal: Patient will remain free of falls  INTERVENTIONS:  - Assess patient frequently for physical needs  - Identify cognitive and physical deficits and behaviors that affect risk of falls    - [place] fall precautions as indicated by assessment   - Educate patient/family on patient safety including physical limitations  - Instruct patient to call for assistance with activity based on assessment  - Modify environment to reduce risk of injury  - Consider OT/PT consult to assist with strengthening/mobility   Outcome: Progressing

## 2017-03-22 ENCOUNTER — ALLSCRIPTS OFFICE VISIT (OUTPATIENT)
Dept: OTHER | Facility: OTHER | Age: 72
End: 2017-03-22

## 2017-03-27 ENCOUNTER — GENERIC CONVERSION - ENCOUNTER (OUTPATIENT)
Dept: OTHER | Facility: OTHER | Age: 72
End: 2017-03-27

## 2017-03-28 RX ORDER — MAGNESIUM SULFATE HEPTAHYDRATE 40 MG/ML
2 INJECTION, SOLUTION INTRAVENOUS ONCE
Status: COMPLETED | OUTPATIENT
Start: 2017-03-29 | End: 2017-03-29

## 2017-03-28 RX ORDER — ATROPINE SULFATE 1 MG/ML
0.25 INJECTION, SOLUTION INTRAMUSCULAR; INTRAVENOUS; SUBCUTANEOUS ONCE
Status: COMPLETED | OUTPATIENT
Start: 2017-03-29 | End: 2017-03-29

## 2017-03-29 ENCOUNTER — HOSPITAL ENCOUNTER (OUTPATIENT)
Dept: INFUSION CENTER | Facility: HOSPITAL | Age: 72
Discharge: HOME | End: 2017-03-29
Payer: MEDICARE

## 2017-03-29 VITALS
TEMPERATURE: 97.5 F | HEART RATE: 94 BPM | HEIGHT: 75 IN | RESPIRATION RATE: 18 BRPM | BODY MASS INDEX: 25.05 KG/M2 | DIASTOLIC BLOOD PRESSURE: 68 MMHG | SYSTOLIC BLOOD PRESSURE: 104 MMHG | WEIGHT: 201.5 LBS

## 2017-03-29 LAB
ALBUMIN SERPL BCP-MCNC: 2.7 G/DL (ref 3.5–5)
ALP SERPL-CCNC: 90 U/L (ref 46–116)
ALT SERPL W P-5'-P-CCNC: 28 U/L (ref 12–78)
ANION GAP SERPL CALCULATED.3IONS-SCNC: 7 MMOL/L (ref 4–13)
AST SERPL W P-5'-P-CCNC: 10 U/L (ref 5–45)
BASOPHILS # BLD AUTO: 0.01 THOUSANDS/ΜL (ref 0–0.1)
BASOPHILS NFR BLD AUTO: 0 % (ref 0–1)
BILIRUB SERPL-MCNC: 0.42 MG/DL (ref 0.2–1)
BUN SERPL-MCNC: 16 MG/DL (ref 5–25)
CALCIUM SERPL-MCNC: 9.5 MG/DL (ref 8.3–10.1)
CHLORIDE SERPL-SCNC: 104 MMOL/L (ref 100–108)
CO2 SERPL-SCNC: 28 MMOL/L (ref 21–32)
CREAT SERPL-MCNC: 0.93 MG/DL (ref 0.6–1.3)
EOSINOPHIL # BLD AUTO: 0.05 THOUSAND/ΜL (ref 0–0.61)
EOSINOPHIL NFR BLD AUTO: 2 % (ref 0–6)
ERYTHROCYTE [DISTWIDTH] IN BLOOD BY AUTOMATED COUNT: 17.7 % (ref 11.6–15.1)
GFR SERPL CREATININE-BSD FRML MDRD: >60 ML/MIN/1.73SQ M
GLUCOSE SERPL-MCNC: 110 MG/DL (ref 65–140)
HCT VFR BLD AUTO: 37.9 % (ref 36.5–49.3)
HGB BLD-MCNC: 12.6 G/DL (ref 12–17)
LYMPHOCYTES # BLD AUTO: 1.08 THOUSANDS/ΜL (ref 0.6–4.47)
LYMPHOCYTES NFR BLD AUTO: 33 % (ref 14–44)
MCH RBC QN AUTO: 26.4 PG (ref 26.8–34.3)
MCHC RBC AUTO-ENTMCNC: 33.2 G/DL (ref 31.4–37.4)
MCV RBC AUTO: 79 FL (ref 82–98)
MONOCYTES # BLD AUTO: 0.36 THOUSAND/ΜL (ref 0.17–1.22)
MONOCYTES NFR BLD AUTO: 11 % (ref 4–12)
NEUTROPHILS # BLD AUTO: 1.72 THOUSANDS/ΜL (ref 1.85–7.62)
NEUTS SEG NFR BLD AUTO: 54 % (ref 43–75)
NRBC BLD AUTO-RTO: 0 /100 WBCS
PLATELET # BLD AUTO: 218 THOUSANDS/UL (ref 149–390)
PMV BLD AUTO: 9.5 FL (ref 8.9–12.7)
POTASSIUM SERPL-SCNC: 3.1 MMOL/L (ref 3.5–5.3)
PROT SERPL-MCNC: 6.3 G/DL (ref 6.4–8.2)
RBC # BLD AUTO: 4.78 MILLION/UL (ref 3.88–5.62)
SODIUM SERPL-SCNC: 139 MMOL/L (ref 136–145)
WBC # BLD AUTO: 3.23 THOUSAND/UL (ref 4.31–10.16)

## 2017-03-29 PROCEDURE — 96366 THER/PROPH/DIAG IV INF ADDON: CPT

## 2017-03-29 PROCEDURE — 96413 CHEMO IV INFUSION 1 HR: CPT

## 2017-03-29 PROCEDURE — 96375 TX/PRO/DX INJ NEW DRUG ADDON: CPT

## 2017-03-29 PROCEDURE — G0498 CHEMO EXTEND IV INFUS W/PUMP: HCPCS

## 2017-03-29 PROCEDURE — 96367 TX/PROPH/DG ADDL SEQ IV INF: CPT

## 2017-03-29 PROCEDURE — 96415 CHEMO IV INFUSION ADDL HR: CPT

## 2017-03-29 PROCEDURE — 85025 COMPLETE CBC W/AUTO DIFF WBC: CPT | Performed by: INTERNAL MEDICINE

## 2017-03-29 PROCEDURE — 96417 CHEMO IV INFUS EACH ADDL SEQ: CPT

## 2017-03-29 PROCEDURE — 80053 COMPREHEN METABOLIC PANEL: CPT | Performed by: INTERNAL MEDICINE

## 2017-03-29 RX ADMIN — ATROPINE SULFATE 0.25 MG: 1 INJECTION, SOLUTION INTRAMUSCULAR; INTRAVENOUS; SUBCUTANEOUS at 13:48

## 2017-03-29 RX ADMIN — IRINOTECAN HYDROCHLORIDE 311 MG: 100 INJECTION, SOLUTION INTRAVENOUS at 13:53

## 2017-03-29 RX ADMIN — PANITUMUMAB 470 MG: 400 SOLUTION INTRAVENOUS at 11:58

## 2017-03-29 RX ADMIN — MAGNESIUM SULFATE HEPTAHYDRATE 2 G: 40 INJECTION, SOLUTION INTRAVENOUS at 09:47

## 2017-03-29 RX ADMIN — DEXAMETHASONE SODIUM PHOSPHATE: 10 INJECTION, SOLUTION INTRAMUSCULAR; INTRAVENOUS at 13:10

## 2017-03-29 NOTE — PROGRESS NOTES
PT stated in conversation that he fell this morning  States he didn't hurt himself, didn't hit anything, caught himself by putting his hands down  Guillermina Lopez RN made aware

## 2017-03-29 NOTE — PLAN OF CARE
Problem: Potential for Falls  Goal: Patient will remain free of falls  INTERVENTIONS:  - Assess patient frequently for physical needs  - Identify cognitive and physical deficits and behaviors that affect risk of falls    - [place] fall precautions as indicated by assessment   - Educate patient/family on patient safety including physical limitations  - Instruct patient to call for assistance with activity based on assessment  - Modify environment to reduce risk of injury  - Consider OT/PT consult to assist with strengthening/mobility   Outcome: Progressing

## 2017-03-30 ENCOUNTER — HOSPITAL ENCOUNTER (OUTPATIENT)
Dept: INFUSION CENTER | Facility: HOSPITAL | Age: 72
Discharge: HOME | End: 2017-03-30
Payer: MEDICARE

## 2017-03-30 RX ORDER — SODIUM CHLORIDE AND POTASSIUM CHLORIDE .9; .15 G/100ML; G/100ML
500 SOLUTION INTRAVENOUS ONCE
Status: COMPLETED | OUTPATIENT
Start: 2017-03-31 | End: 2017-03-31

## 2017-03-31 ENCOUNTER — HOSPITAL ENCOUNTER (OUTPATIENT)
Dept: INFUSION CENTER | Facility: HOSPITAL | Age: 72
Discharge: HOME | End: 2017-03-31
Payer: MEDICARE

## 2017-03-31 VITALS
DIASTOLIC BLOOD PRESSURE: 75 MMHG | HEART RATE: 81 BPM | SYSTOLIC BLOOD PRESSURE: 117 MMHG | TEMPERATURE: 97.7 F | RESPIRATION RATE: 18 BRPM

## 2017-03-31 PROCEDURE — 96360 HYDRATION IV INFUSION INIT: CPT

## 2017-03-31 PROCEDURE — 96361 HYDRATE IV INFUSION ADD-ON: CPT

## 2017-03-31 RX ADMIN — Medication 300 UNITS: at 16:26

## 2017-03-31 RX ADMIN — SODIUM CHLORIDE AND POTASSIUM CHLORIDE 500 ML/HR: .9; .15 SOLUTION INTRAVENOUS at 14:14

## 2017-03-31 NOTE — PLAN OF CARE
Problem: Potential for Falls  Goal: Patient will remain free of falls  INTERVENTIONS:  - Assess patient frequently for physical needs  - Identify cognitive and physical deficits and behaviors that affect risk of falls    - [place] fall precautions as indicated by assessment   - Educate patient/family on patient safety including physical limitations  - Instruct patient to call for assistance with activity based on assessment  - Modify environment to reduce risk of injury  - Consider OT/PT consult to assist with strengthening/mobility   Outcome: Progressing

## 2017-03-31 NOTE — PLAN OF CARE
Problem: Potential for Falls  Goal: Patient will remain free of falls  INTERVENTIONS:  - Assess patient frequently for physical needs  - Identify cognitive and physical deficits and behaviors that affect risk of falls    - [place] fall precautions as indicated by assessment   - Educate patient/family on patient safety including physical limitations  - Instruct patient to call for assistance with activity based on assessment  - Modify environment to reduce risk of injury  - Consider OT/PT consult to assist with strengthening/mobility   Outcome: Progressing

## 2017-03-31 NOTE — PROGRESS NOTES
Pt  States he had difficulty navigating around today without using his hands on the walls, here for hydration with electrolytes priviously scheduled due to a fall prior to chemo

## 2017-04-07 ENCOUNTER — HOSPITAL ENCOUNTER (OUTPATIENT)
Dept: INFUSION CENTER | Facility: HOSPITAL | Age: 72
Discharge: HOME | End: 2017-04-07
Payer: MEDICARE

## 2017-04-07 VITALS
SYSTOLIC BLOOD PRESSURE: 90 MMHG | DIASTOLIC BLOOD PRESSURE: 60 MMHG | TEMPERATURE: 97.5 F | HEART RATE: 80 BPM | RESPIRATION RATE: 20 BRPM

## 2017-04-07 DIAGNOSIS — C80.1 PRIMARY MALIGNANT NEOPLASM (HCC): ICD-10-CM

## 2017-04-07 PROCEDURE — 96361 HYDRATE IV INFUSION ADD-ON: CPT

## 2017-04-07 PROCEDURE — 96360 HYDRATION IV INFUSION INIT: CPT

## 2017-04-07 RX ORDER — SODIUM CHLORIDE 9 MG/ML
500 INJECTION, SOLUTION INTRAVENOUS ONCE
Status: COMPLETED | OUTPATIENT
Start: 2017-04-07 | End: 2017-04-07

## 2017-04-07 RX ADMIN — SODIUM CHLORIDE 500 ML/HR: 0.9 INJECTION, SOLUTION INTRAVENOUS at 13:12

## 2017-04-07 RX ADMIN — Medication 300 UNITS: at 15:06

## 2017-04-07 NOTE — PLAN OF CARE
Problem: Potential for Falls  Goal: Patient will remain free of falls  INTERVENTIONS:  - Assess patient frequently for physical needs  - Identify cognitive and physical deficits and behaviors that affect risk of falls    - [place] fall precautions as indicated by assessment   - Educate patient/family on patient safety including physical limitations  - Instruct patient to call for assistance with activity based on assessment  - Modify environment to reduce risk of injury  - Consider OT/PT consult to assist with strengthening/mobility   Outcome: Progressing

## 2017-04-10 ENCOUNTER — GENERIC CONVERSION - ENCOUNTER (OUTPATIENT)
Dept: OTHER | Facility: OTHER | Age: 72
End: 2017-04-10

## 2017-04-11 RX ORDER — MAGNESIUM SULFATE HEPTAHYDRATE 40 MG/ML
2 INJECTION, SOLUTION INTRAVENOUS ONCE
Status: COMPLETED | OUTPATIENT
Start: 2017-04-12 | End: 2017-04-12

## 2017-04-11 RX ORDER — ATROPINE SULFATE 1 MG/ML
0.25 INJECTION, SOLUTION INTRAMUSCULAR; INTRAVENOUS; SUBCUTANEOUS ONCE
Status: COMPLETED | OUTPATIENT
Start: 2017-04-12 | End: 2017-04-12

## 2017-04-12 ENCOUNTER — HOSPITAL ENCOUNTER (OUTPATIENT)
Dept: INFUSION CENTER | Facility: HOSPITAL | Age: 72
Discharge: HOME | End: 2017-04-12
Payer: MEDICARE

## 2017-04-12 LAB
ALBUMIN SERPL BCP-MCNC: 3 G/DL (ref 3.5–5)
ALP SERPL-CCNC: 104 U/L (ref 46–116)
ALT SERPL W P-5'-P-CCNC: 43 U/L (ref 12–78)
ANION GAP SERPL CALCULATED.3IONS-SCNC: 10 MMOL/L (ref 4–13)
AST SERPL W P-5'-P-CCNC: 19 U/L (ref 5–45)
BASOPHILS # BLD AUTO: 0.01 THOUSANDS/ΜL (ref 0–0.1)
BASOPHILS NFR BLD AUTO: 0 % (ref 0–1)
BILIRUB SERPL-MCNC: 0.55 MG/DL (ref 0.2–1)
BUN SERPL-MCNC: 16 MG/DL (ref 5–25)
CALCIUM SERPL-MCNC: 10.2 MG/DL (ref 8.3–10.1)
CHLORIDE SERPL-SCNC: 108 MMOL/L (ref 100–108)
CO2 SERPL-SCNC: 25 MMOL/L (ref 21–32)
CREAT SERPL-MCNC: 1.05 MG/DL (ref 0.6–1.3)
EOSINOPHIL # BLD AUTO: 0.04 THOUSAND/ΜL (ref 0–0.61)
EOSINOPHIL NFR BLD AUTO: 1 % (ref 0–6)
ERYTHROCYTE [DISTWIDTH] IN BLOOD BY AUTOMATED COUNT: 18.9 % (ref 11.6–15.1)
GFR SERPL CREATININE-BSD FRML MDRD: >60 ML/MIN/1.73SQ M
GLUCOSE SERPL-MCNC: 129 MG/DL (ref 65–140)
HCT VFR BLD AUTO: 42.6 % (ref 36.5–49.3)
HGB BLD-MCNC: 14.5 G/DL (ref 12–17)
LYMPHOCYTES # BLD AUTO: 1.07 THOUSANDS/ΜL (ref 0.6–4.47)
LYMPHOCYTES NFR BLD AUTO: 26 % (ref 14–44)
MCH RBC QN AUTO: 26.5 PG (ref 26.8–34.3)
MCHC RBC AUTO-ENTMCNC: 34 G/DL (ref 31.4–37.4)
MCV RBC AUTO: 78 FL (ref 82–98)
MONOCYTES # BLD AUTO: 0.5 THOUSAND/ΜL (ref 0.17–1.22)
MONOCYTES NFR BLD AUTO: 12 % (ref 4–12)
NEUTROPHILS # BLD AUTO: 2.49 THOUSANDS/ΜL (ref 1.85–7.62)
NEUTS SEG NFR BLD AUTO: 61 % (ref 43–75)
NRBC BLD AUTO-RTO: 0 /100 WBCS
PLATELET # BLD AUTO: 261 THOUSANDS/UL (ref 149–390)
PMV BLD AUTO: 10 FL (ref 8.9–12.7)
POTASSIUM SERPL-SCNC: 2.8 MMOL/L (ref 3.5–5.3)
PROT SERPL-MCNC: 6.8 G/DL (ref 6.4–8.2)
RBC # BLD AUTO: 5.47 MILLION/UL (ref 3.88–5.62)
SODIUM SERPL-SCNC: 143 MMOL/L (ref 136–145)
WBC # BLD AUTO: 4.12 THOUSAND/UL (ref 4.31–10.16)

## 2017-04-12 PROCEDURE — 83735 ASSAY OF MAGNESIUM: CPT

## 2017-04-12 PROCEDURE — 96375 TX/PRO/DX INJ NEW DRUG ADDON: CPT

## 2017-04-12 PROCEDURE — 96417 CHEMO IV INFUS EACH ADDL SEQ: CPT

## 2017-04-12 PROCEDURE — 96366 THER/PROPH/DIAG IV INF ADDON: CPT

## 2017-04-12 PROCEDURE — 96367 TX/PROPH/DG ADDL SEQ IV INF: CPT

## 2017-04-12 PROCEDURE — G0498 CHEMO EXTEND IV INFUS W/PUMP: HCPCS

## 2017-04-12 PROCEDURE — 96415 CHEMO IV INFUSION ADDL HR: CPT

## 2017-04-12 PROCEDURE — 80053 COMPREHEN METABOLIC PANEL: CPT | Performed by: INTERNAL MEDICINE

## 2017-04-12 PROCEDURE — 96413 CHEMO IV INFUSION 1 HR: CPT

## 2017-04-12 PROCEDURE — 85025 COMPLETE CBC W/AUTO DIFF WBC: CPT | Performed by: INTERNAL MEDICINE

## 2017-04-12 RX ADMIN — DEXAMETHASONE SODIUM PHOSPHATE: 10 INJECTION, SOLUTION INTRAMUSCULAR; INTRAVENOUS at 12:10

## 2017-04-12 RX ADMIN — ATROPINE SULFATE 0.25 MG: 1 INJECTION, SOLUTION INTRAMUSCULAR; INTRAVENOUS; SUBCUTANEOUS at 13:15

## 2017-04-12 RX ADMIN — IRINOTECAN HYDROCHLORIDE 311 MG: 100 INJECTION, SOLUTION INTRAVENOUS at 13:52

## 2017-04-12 RX ADMIN — PANITUMUMAB 470 MG: 400 SOLUTION INTRAVENOUS at 12:47

## 2017-04-12 RX ADMIN — MAGNESIUM SULFATE HEPTAHYDRATE 2 G: 40 INJECTION, SOLUTION INTRAVENOUS at 10:02

## 2017-04-12 NOTE — PLAN OF CARE
Problem: Potential for Falls  Goal: Patient will remain free of falls  INTERVENTIONS:  - Assess patient frequently for physical needs  - Identify cognitive and physical deficits and behaviors that affect risk of falls    - [place] fall precautions as indicated by assessment   - Educate patient/family on patient safety including physical limitations  - Instruct patient to call for assistance with activity based on assessment  - Modify environment to reduce risk of injury  - Consider OT/PT consult to assist with strengthening/mobility   Outcome: Progressing

## 2017-04-12 NOTE — PROGRESS NOTES
Right chest port with good blood return  cadd connected and infusing    Pt to return Friday at 1330 and verbalized understanding

## 2017-04-13 DIAGNOSIS — C80.1 PRIMARY MALIGNANT NEOPLASM (HCC): ICD-10-CM

## 2017-04-13 LAB — MAGNESIUM SERPL-MCNC: 2.1 MG/DL (ref 1.6–2.6)

## 2017-04-13 RX ORDER — SODIUM CHLORIDE AND POTASSIUM CHLORIDE .9; .15 G/100ML; G/100ML
500 SOLUTION INTRAVENOUS ONCE
Status: COMPLETED | OUTPATIENT
Start: 2017-04-14 | End: 2017-04-14

## 2017-04-14 ENCOUNTER — HOSPITAL ENCOUNTER (OUTPATIENT)
Dept: INFUSION CENTER | Facility: HOSPITAL | Age: 72
Discharge: HOME | End: 2017-04-14
Payer: MEDICARE

## 2017-04-14 VITALS
HEIGHT: 75 IN | SYSTOLIC BLOOD PRESSURE: 103 MMHG | HEART RATE: 80 BPM | RESPIRATION RATE: 16 BRPM | BODY MASS INDEX: 24.64 KG/M2 | DIASTOLIC BLOOD PRESSURE: 70 MMHG | WEIGHT: 198.19 LBS | TEMPERATURE: 98 F

## 2017-04-14 PROCEDURE — 96360 HYDRATION IV INFUSION INIT: CPT

## 2017-04-14 PROCEDURE — 96361 HYDRATE IV INFUSION ADD-ON: CPT

## 2017-04-14 RX ADMIN — SODIUM CHLORIDE AND POTASSIUM CHLORIDE 500 ML/HR: .9; .15 SOLUTION INTRAVENOUS at 13:56

## 2017-04-14 RX ADMIN — Medication 300 UNITS: at 16:07

## 2017-04-14 NOTE — PLAN OF CARE
Problem: Potential for Falls  Goal: Patient will remain free of falls  INTERVENTIONS:  - Assess patient frequently for physical needs  - Identify cognitive and physical deficits and behaviors that affect risk of falls    - [place] fall precautions as indicated by assessment   - Educate patient/family on patient safety including physical limitations  - Instruct patient to call for assistance with activity based on assessment  - Modify environment to reduce risk of injury  - Consider OT/PT consult to assist with strengthening/mobility   Outcome: Progressing

## 2017-04-14 NOTE — PROGRESS NOTES
discrepancy in res volume at disconnect 67ml  Event log from cadd pump below:   4/13/17 at 1341 [place] alarm upstream   Pump stopped and res volume at that time 71 ml  4/13/17 1341 res volume reset to 138ml  4/13/17 1342 pump started   4/14/17 1342 pump stopped as scheduled   Bag empty in [facility]

## 2017-04-17 ENCOUNTER — HOSPITAL ENCOUNTER (OUTPATIENT)
Dept: CT IMAGING | Facility: HOSPITAL | Age: 72
Discharge: HOME | End: 2017-04-17
Attending: INTERNAL MEDICINE
Payer: MEDICARE

## 2017-04-17 DIAGNOSIS — C80.1 PRIMARY MALIGNANT NEOPLASM (HCC): ICD-10-CM

## 2017-04-17 PROCEDURE — 74177 CT ABD & PELVIS W/CONTRAST: CPT

## 2017-04-17 PROCEDURE — 71260 CT THORAX DX C+: CPT

## 2017-04-17 RX ADMIN — IOHEXOL 100 ML: 350 INJECTION, SOLUTION INTRAVENOUS at 09:44

## 2017-04-19 ENCOUNTER — ALLSCRIPTS OFFICE VISIT (OUTPATIENT)
Dept: OTHER | Facility: OTHER | Age: 72
End: 2017-04-19

## 2017-04-21 ENCOUNTER — GENERIC CONVERSION - ENCOUNTER (OUTPATIENT)
Dept: OTHER | Facility: OTHER | Age: 72
End: 2017-04-21

## 2017-04-26 ENCOUNTER — HOSPITAL ENCOUNTER (OUTPATIENT)
Dept: INFUSION CENTER | Facility: HOSPITAL | Age: 72
End: 2017-04-26
Payer: MEDICARE

## 2017-04-27 ENCOUNTER — GENERIC CONVERSION - ENCOUNTER (OUTPATIENT)
Dept: OTHER | Facility: OTHER | Age: 72
End: 2017-04-27

## 2018-01-12 NOTE — MISCELLANEOUS
Message  email from son Micaela Malcolm, he wanted his dad to stay with him during chemo but the patient refused  patient had chemo on 2/15/17  Micaela Malcolm reports patient has no appetite, is "losing weight", and he is not sure when his last BM was  left message for patient to call me to discuss      Active Problems    1  Abdominal aortic aneurysm (AAA) without rupture (441 4) (I71 4)   2  Abnormal weight loss (783 21) (R63 4)   3  Adenocarcinoma (199 1) (C80 1)   4  Bilateral edema of lower extremity (782 3) (R60 0)   5  Colonic mass (569 89) (K63 9)   6  Diabetes mellitus (250 00) (E11 9)   7  Enlarged prostate (600 00) (N40 0)   8  Essential hypertension (401 9) (I10)   9  Generalized abdominal pain (789 07) (R10 84)   10  GERD (gastroesophageal reflux disease) (530 81) (K21 9)   11  Hypercholesteremia (272 0) (E78 00)   12  Hypotension (458 9) (I95 9)   13  Protein-calorie malnutrition (263 9) (E46)    Current Meds   1  AmLODIPine Besylate 10 MG Oral Tablet; TAKE 1 TABLET DAILY; Therapy: 72MMV7965 to (Evaluate:28Jun2017) Recorded   2  Aspirin 81 MG TABS; TAKE 1 TABLET DAILY; Therapy: (Recorded:11Jan2017) to Recorded   3  Carvedilol 25 MG Oral Tablet; TAKE 1 TABLET TWICE DAILY; Therapy: 41XLT3755 to Recorded   4  Doxazosin Mesylate 4 MG Oral Tablet; Take 1 tablet daily; Therapy: 18ZVO1389 to Recorded   5  Ensure Active High Protein LIQD; SWALLOW ML 4 times daily; Therapy: (Recorded:11Jan2017) to Recorded   6  Fluticasone Propionate 50 MCG/ACT Nasal Suspension; INHALE 2 SPRAY Daily; Therapy: (Recorded:11Jan2017) to Recorded   7  Latanoprost 0 005 % Ophthalmic Solution; INSTILL 1 DROP IN BOTH EYES AT   BEDTIME; Therapy: 94ZZI9819 to Recorded   8  Lidocaine-Prilocaine 2 5-2 5 % External Cream; APPLY 1 TO 2 HOURS PRIOR TO   TREATMENT AS DIRECTED; Therapy: 37HWP3082 to (Last Rx:63Zml8557)  Requested for: 06LVP6588 Ordered   9  MetFORMIN HCl - 1000 MG Oral Tablet; TAKE 1 TABLET DAILY WITH FOOD;    Therapy: 71MHO7014 to Recorded   10  MiraLax Oral Packet (Polyethylene Glycol 3350); take 1 packet daily; Therapy: (Recorded:11Jan2017) to Recorded   11  Omeprazole 20 MG Oral Capsule Delayed Release; Take 1 capsule twice daily; Therapy: (Recorded:11Jan2017) to Recorded   12  Potassium Chloride 10 MEQ TBCR; TAKE 2 TABLETS DAILY; Therapy: (Recorded:11Jan2017) to Recorded   13  Prochlorperazine Maleate 10 MG Oral Tablet; TAKE 1 TABLET EVERY 6 HOURS AS    NEEDED; Therapy: 25NQW3638 to (Evaluate:30Mar2017)  Requested for: 81LEH9829; Last    Rx:[id number] Ordered   14  Senna-Docusate Sodium 8 6-50 MG Oral Tablet; TAKE 1 TABLET TWICE DAILY; Therapy: (Recorded:11Jan2017) to Recorded    Allergies    1   No Known Drug Allergies    Signatures   Electronically signed by : Zohra Rodriguez, ; Feb 21 2017  9:33AM EST                       (Author)

## 2018-01-13 VITALS
SYSTOLIC BLOOD PRESSURE: 100 MMHG | WEIGHT: 208 LBS | DIASTOLIC BLOOD PRESSURE: 66 MMHG | BODY MASS INDEX: 25.86 KG/M2 | HEART RATE: 89 BPM | OXYGEN SATURATION: 99 % | HEIGHT: 75 IN | RESPIRATION RATE: 16 BRPM | TEMPERATURE: 96.3 F

## 2018-01-13 VITALS
HEIGHT: 75 IN | BODY MASS INDEX: 29.73 KG/M2 | TEMPERATURE: 95 F | OXYGEN SATURATION: 98 % | DIASTOLIC BLOOD PRESSURE: 72 MMHG | SYSTOLIC BLOOD PRESSURE: 120 MMHG | HEART RATE: 65 BPM | RESPIRATION RATE: 16 BRPM | WEIGHT: 239.13 LBS

## 2018-01-13 VITALS
DIASTOLIC BLOOD PRESSURE: 76 MMHG | OXYGEN SATURATION: 97 % | WEIGHT: 223 LBS | SYSTOLIC BLOOD PRESSURE: 118 MMHG | BODY MASS INDEX: 27.73 KG/M2 | TEMPERATURE: 97.3 F | HEART RATE: 60 BPM | RESPIRATION RATE: 16 BRPM | HEIGHT: 75 IN

## 2018-01-13 NOTE — MISCELLANEOUS
Message  call from patient  he is c/o lower back pain, taking percocet one every 8 hours since the weekend  pain is relieved and appetite is improving but he is requesting IVFluids with his chemo, he also c/o intermittent dizziness since taking pain meds  set up for IVF on 3/30/17 and every 2 weeks at each disconnect in April  consult also made to palliative care  son informed of these interventions as well      Active Problems    1  Abdominal aortic aneurysm (AAA) without rupture (441 4) (I71 4)   2  Abnormal weight loss (783 21) (R63 4)   3  Adenocarcinoma (199 1) (C80 1)   4  Bilateral edema of lower extremity (782 3) (R60 0)   5  Colonic mass (569 89) (K63 9)   6  Diabetes mellitus (250 00) (E11 9)   7  Enlarged prostate (600 00) (N40 0)   8  Essential hypertension (401 9) (I10)   9  Generalized abdominal pain (789 07) (R10 84)   10  GERD (gastroesophageal reflux disease) (530 81) (K21 9)   11  Hypercholesteremia (272 0) (E78 00)   12  Hypotension (458 9) (I95 9)   13  Protein-calorie malnutrition (263 9) (E46)   14  Right foot drop (736 79) (M21 371)    Current Meds   1  Doxazosin Mesylate 4 MG Oral Tablet; Take 1 tablet daily; Therapy: 14LYU0735 to Recorded   2  Latanoprost 0 005 % Ophthalmic Solution; INSTILL 1 DROP IN BOTH EYES AT   BEDTIME; Therapy: 10PCC7882 to Recorded   3  Lidocaine-Prilocaine 2 5-2 5 % External Cream; APPLY 1 TO 2 HOURS PRIOR TO   TREATMENT AS DIRECTED; Therapy: 99JMK6942 to (Last Rx:13Feb2017)  Requested for: 13ZSV9900 Ordered   4  Omeprazole 20 MG Oral Capsule Delayed Release; Take 1 capsule twice daily; Therapy: (Recorded:11Jan2017) to Recorded   5  Prochlorperazine Maleate 10 MG Oral Tablet; TAKE 1 TABLET EVERY 6 HOURS AS   NEEDED; Therapy: 02QMD8668 to (Evaluate:30Mar2017)  Requested for: 83LUF7993; Last   Rx:13Feb2017 Ordered   6  Senna-Docusate Sodium 8 6-50 MG Oral Tablet; TAKE 1 TABLET TWICE DAILY; Therapy: (Recorded:11Jan2017) to Recorded    Allergies    1   No Known Drug Allergies    Plan  Adenocarcinoma    · *1 SL PALLIATIVE CARE Physician Referral  Consult Only: the expectation is that the  referring provider will communicate back to the patient on treatment options  Evaluation  and Treatment: the expectation is that the referred to provider will communicate back  to the patient on treatment options      please consult palliative care for pain management  Status: Hold For -  Scheduling,Retrospective By Protocol Authorization  Requested for: [id number]  Care Summary provided  : Yes    Signatures   Electronically signed by : Yosvany Raymond, ; Mar 27 2017  3:35PM EST                       (Author)

## 2018-01-13 NOTE — RESULT NOTES
Verified Results  * CT ABDOMEN PELVIS W CONTRAST [id number] 06:00PM Karson Ortega     Test Name Result Flag Reference   CT ABDOMEN PELVIS W CONTRAST (Report)     CT ABDOMEN AND PELVIS WITH IV CONTRAST     INDICATION: Unable to drink  Constipation  COMPARISON: None  TECHNIQUE: CT examination of the abdomen and pelvis  Axial, sagittal and coronal reformatted projections were created  This examination, like all CT scans performed in the [facility], was performed utilizing techniques to    minimize radiation dose exposure, including the use of iterative reconstruction and automated exposure control  IV Contrast: iohexol (OMNIPAQUE) 350 MG/ML injection (MULTI-DOSE) 100 mL-- Note: (SINGLE DOSE/MULTI DOSE) information refers to the container from which the contrast was acquired  Contrast was injected one time intravenously without immediate    complication  Enteric Contrast: iohexol (OMNIPAQUE) 240 MG/ML solution 50 mL   FINDINGS:     ABDOMEN     LOWER CHEST: Atelectasis at the right lung base  Small to moderate-sized pleural effusion on the left  There is a spiculated masslike density at the left lung base measuring 2 3 x 1 9 cm  LIVER/BILIARY TREE: Unremarkable  GALLBLADDER: The gallbladder appears contracted  SPLEEN: Unremarkable  PANCREAS: Unremarkable  ADRENAL GLANDS: Unremarkable  KIDNEYS/URETERS: Bilateral lower pole renal cysts  STOMACH AND BOWEL: There is a large amount of liquid stool in the ascending and proximal transverse colons  These are distended to 8 cm  Air and stool are seen in the left colon  There are multiple small bowel loops which are congregated to the mid    abdomen    There is thickening of the walls of the rectosigmoid colon concerning for carcinoma of the colon  There appears to be some infiltration in the mesentery concerning for carcinomatosis  APPENDIX: No findings to suggest appendicitis       ABDOMINOPELVIC CAVITY: Upper abdominal ascites between the liver and spleen  VESSELS: Extensive vascular calcifications  Infrarenal abdominal aortic aneurysm with thrombus measuring 4 8 x 3 9 cm  PELVIS     REPRODUCTIVE ORGANS: Enlarged prostate gland  URINARY BLADDER: The bladder is decompressed and thick walled  ABDOMINAL WALL/INGUINAL REGIONS: Unremarkable  OSSEOUS STRUCTURES: No acute fracture or destructive osseous lesion  IMPRESSION:       1  The right colon is distended and filled with liquid stool  The left colon is distended with feces as well  2  Thickening of the walls of the sigmoid colon concerning for neoplasia  This may be the etiology of the colonic distention proximal to this point  Colonoscopy recommended  3  Suspected carcinomatosis in the abdomen  Ascites  4  Abdominal aortic aneurysm with thrombus  5  Masslike density versus atelectasis at the left lung base  CT of the chest recommended         Workstation performed: [id number]     Signed by:   Isa Goddard MD   12/30/16

## 2018-01-14 VITALS
DIASTOLIC BLOOD PRESSURE: 80 MMHG | TEMPERATURE: 97 F | BODY MASS INDEX: 21.88 KG/M2 | OXYGEN SATURATION: 91 % | SYSTOLIC BLOOD PRESSURE: 110 MMHG | HEIGHT: 75 IN | RESPIRATION RATE: 16 BRPM | HEART RATE: 83 BPM | WEIGHT: 176 LBS

## 2018-01-14 NOTE — MISCELLANEOUS
Message   Recorded as Task   Date: 04/27/2017 10:26 AM, Created By: Marco Antonio Suarez   Task Name: Call Back   Assigned To: Dejah Davis   Regarding Patient: Mary Butler, Status: Active   Comment:    AlbertoRudyKatelyn - 27 Apr 2017 10:26 AM     TASK CREATED  Messi Erickson, Pharmacist; [phone]  We were contacted by [address]  VNA nurse, Sarah, requesting Morphine for the patient  I see that Dr Atilio Deshpande approved the hospice  Can you please help the patient/VNA? Thank you! Dejah Davis - 27 Apr 2017 10:42 AM     TASK EDITED  I spoke with Shruthi Katz, the hospice pharmacy is out of [facility]; Shruthi Katz arranged for local [facility]'s to supplt med  I faxed an order over and he will facilitate delivery of med to patient        Active Problems    1  Abdominal aortic aneurysm (AAA) without rupture (441 4) (I71 4)   2  Abnormal weight loss (783 21) (R63 4)   3  Adenocarcinoma (199 1) (C80 1)   4  Bilateral edema of lower extremity (782 3) (R60 0)   5  Colonic mass (569 89) (K63 9)   6  Diabetes mellitus (250 00) (E11 9)   7  Enlarged prostate (600 00) (N40 0)   8  Essential hypertension (401 9) (I10)   9  Generalized abdominal pain (789 07) (R10 84)   10  GERD (gastroesophageal reflux disease) (530 81) (K21 9)   11  Hypercholesteremia (272 0) (E78 00)   12  Hypotension (458 9) (I95 9)   13  Protein-calorie malnutrition (263 9) (E46)   14  Right foot drop (736 79) (M21 371)    Current Meds   1  Doxazosin Mesylate 4 MG Oral Tablet; Take 1 tablet daily; Therapy: 68TXZ3552 to Recorded   2  Latanoprost 0 005 % Ophthalmic Solution; INSTILL 1 DROP IN BOTH EYES AT   BEDTIME; Therapy: 52TVO4978 to Recorded   3  Lidocaine-Prilocaine 2 5-2 5 % External Cream; APPLY 1 TO 2 HOURS PRIOR TO   TREATMENT AS DIRECTED; Therapy: 48PSO3744 to (Last Rx:46Kap7776)  Requested for: 36BMH3210 Ordered   4  Omeprazole 20 MG Oral Capsule Delayed Release; Take 1 capsule twice daily; Therapy: (Recorded:11Jan2017) to Recorded   5  Prochlorperazine Maleate 10 MG Oral Tablet; TAKE 1 TABLET EVERY 6 HOURS AS   NEEDED; Therapy: 50WAX7933 to (Evaluate:24Pbk7631)  Requested for: 06Apr2017; Last   Rx:06Apr2017 Ordered   6  Senna-Docusate Sodium 8 6-50 MG Oral Tablet; TAKE 1 TABLET TWICE DAILY; Therapy: (Recorded:11Jan2017) to Recorded    Allergies    1   No Known Drug Allergies    Signatures   Electronically signed by : Rebecca Power, ; Apr 27 2017 10:42AM EST                       (Author)

## 2018-01-14 NOTE — PROGRESS NOTES
Discussion/Summary  Social Work-Discussion Summary [facility]: Patient is being seen for an initial assessment   Per referral from med onc, HARJITW introduced her role to pt and his adult son on 2/22/2017  Pt denied current issues coping with his cancer diagnosis or treatment  Pt discussed feeling optimistic about his care and denied requiring help at home  Pt did mention drinking coffee is an important part of his quality of life and he would not desire additional treatment if he becomes unable to drink coffee  LSW provided pt supportive counseling during this assessment  Pt and his son denied requiring social work assistance at this time  Pt and his son were provided LSW's contact information and were encouraged to follow up as needed for assistance        Signatures   Electronically signed by : PARTHA Ansari; Feb 22 2017  1:49PM EST                       (Author)

## 2018-01-15 NOTE — MISCELLANEOUS
Message  per Jan Ovens in histology ext [phone]  tumor specimen insufficient for CARIS testing, Dr Rashad Dupree aware  will send out for N-WARREN, K-WARREN only   request emailed      Active Problems    1  Abdominal aortic aneurysm (AAA) without rupture (441 4) (I71 4)   2  Abnormal weight loss (783 21) (R63 4)   3  Adenocarcinoma (199 1) (C80 1)   4  Bilateral edema of lower extremity (782 3) (R60 0)   5  Colonic mass (569 89) (K63 9)   6  Diabetes mellitus (250 00) (E11 9)   7  Enlarged prostate (600 00) (N40 0)   8  Essential hypertension (401 9) (I10)   9  Generalized abdominal pain (789 07) (R10 84)   10  GERD (gastroesophageal reflux disease) (530 81) (K21 9)   11  Hypercholesteremia (272 0) (E78 00)   12  Hypotension (458 9) (I95 9)   13  Protein-calorie malnutrition (263 9) (E46)    Current Meds   1  AmLODIPine Besylate 10 MG Oral Tablet; TAKE 1 TABLET DAILY; Therapy: 93ZNF1017 to (Evaluate:28Jun2017) Recorded   2  Aspirin 81 MG TABS; TAKE 1 TABLET DAILY; Therapy: (Recorded:11Jan2017) to Recorded   3  Carvedilol 25 MG Oral Tablet; TAKE 1 TABLET TWICE DAILY; Therapy: 21QQO1225 to Recorded   4  Doxazosin Mesylate 4 MG Oral Tablet; Take 1 tablet daily; Therapy: 02EZH9443 to Recorded   5  Ensure Active High Protein LIQD; SWALLOW ML 4 times daily; Therapy: (Recorded:11Jan2017) to Recorded   6  Fluticasone Propionate 50 MCG/ACT Nasal Suspension; INHALE 2 SPRAY Daily; Therapy: (Recorded:11Jan2017) to Recorded   7  Latanoprost 0 005 % Ophthalmic Solution; INSTILL 1 DROP IN BOTH EYES AT   BEDTIME; Therapy: 00THT6725 to Recorded   8  Lidocaine-Prilocaine 2 5-2 5 % External Cream; APPLY 1 TO 2 HOURS PRIOR TO   TREATMENT AS DIRECTED; Therapy: 96EOQ4415 to (Last Rx:66Hxa3116)  Requested for: 96PMS5661 Ordered   9  MetFORMIN HCl - 1000 MG Oral Tablet; TAKE 1 TABLET DAILY WITH FOOD; Therapy: 19HHW2071 to Recorded   10  MiraLax Oral Packet (Polyethylene Glycol 3350); take 1 packet daily;     Therapy: (Recorded:11Jan2017) to Recorded   11  Omeprazole 20 MG Oral Capsule Delayed Release; Take 1 capsule twice daily; Therapy: (Recorded:11Jan2017) to Recorded   12  Potassium Chloride 10 MEQ TBCR; TAKE 2 TABLETS DAILY; Therapy: (Recorded:11Jan2017) to Recorded   13  Prochlorperazine Maleate 10 MG Oral Tablet; TAKE 1 TABLET EVERY 6 HOURS AS    NEEDED; Therapy: 43DFH3974 to (Evaluate:30Mar2017)  Requested for: 28GVK9042; Last    Rx:[id number] Ordered   14  Senna-Docusate Sodium 8 6-50 MG Oral Tablet; TAKE 1 TABLET TWICE DAILY; Therapy: (Recorded:11Jan2017) to Recorded    Allergies    1   No Known Drug Allergies    Signatures   Electronically signed by : Neeru Weaver, ; Feb 15 2017  1:57PM EST                       (Author)

## 2018-01-16 NOTE — MISCELLANEOUS
Message  left messages times 2 for patient to check on status  set up appt with PA for tomorrow  emailed son to notify      Active Problems    1  Abdominal aortic aneurysm (AAA) without rupture (441 4) (I71 4)   2  Abnormal weight loss (783 21) (R63 4)   3  Adenocarcinoma (199 1) (C80 1)   4  Bilateral edema of lower extremity (782 3) (R60 0)   5  Colonic mass (569 89) (K63 9)   6  Diabetes mellitus (250 00) (E11 9)   7  Enlarged prostate (600 00) (N40 0)   8  Essential hypertension (401 9) (I10)   9  Generalized abdominal pain (789 07) (R10 84)   10  GERD (gastroesophageal reflux disease) (530 81) (K21 9)   11  Hypercholesteremia (272 0) (E78 00)   12  Hypotension (458 9) (I95 9)   13  Protein-calorie malnutrition (263 9) (E46)    Current Meds   1  AmLODIPine Besylate 10 MG Oral Tablet; TAKE 1 TABLET DAILY; Therapy: 71JIF8302 to (Evaluate:28Jun2017) Recorded   2  Aspirin 81 MG TABS; TAKE 1 TABLET DAILY; Therapy: (Recorded:11Jan2017) to Recorded   3  Carvedilol 25 MG Oral Tablet; TAKE 1 TABLET TWICE DAILY; Therapy: 51MBG0001 to Recorded   4  Doxazosin Mesylate 4 MG Oral Tablet; Take 1 tablet daily; Therapy: 50KHO2095 to Recorded   5  Ensure Active High Protein LIQD; SWALLOW ML 4 times daily; Therapy: (Recorded:11Jan2017) to Recorded   6  Fluticasone Propionate 50 MCG/ACT Nasal Suspension; INHALE 2 SPRAY Daily; Therapy: (Recorded:11Jan2017) to Recorded   7  Latanoprost 0 005 % Ophthalmic Solution; INSTILL 1 DROP IN BOTH EYES AT   BEDTIME; Therapy: 01EDW4114 to Recorded   8  Lidocaine-Prilocaine 2 5-2 5 % External Cream; APPLY 1 TO 2 HOURS PRIOR TO   TREATMENT AS DIRECTED; Therapy: 83WVB8128 to (Last Rx:24Pnm4912)  Requested for: 70GND5265 Ordered   9  MetFORMIN HCl - 1000 MG Oral Tablet; TAKE 1 TABLET DAILY WITH FOOD; Therapy: 51BFR9687 to Recorded   10  MiraLax Oral Packet (Polyethylene Glycol 3350); take 1 packet daily; Therapy: (Recorded:11Jan2017) to Recorded   11   Omeprazole 20 MG Oral Capsule Delayed Release; Take 1 capsule twice daily; Therapy: (Recorded:11Jan2017) to Recorded   12  Potassium Chloride 10 MEQ TBCR; TAKE 2 TABLETS DAILY; Therapy: (Recorded:11Jan2017) to Recorded   13  Prochlorperazine Maleate 10 MG Oral Tablet; TAKE 1 TABLET EVERY 6 HOURS AS    NEEDED; Therapy: 50RAC1907 to (Evaluate:30Mar2017)  Requested for: 42EYE2056; Last    Rx:[id number] Ordered   14  Senna-Docusate Sodium 8 6-50 MG Oral Tablet; TAKE 1 TABLET TWICE DAILY; Therapy: (Recorded:11Jan2017) to Recorded    Allergies    1   No Known Drug Allergies    Signatures   Electronically signed by : Gurwinder Hinds, ; Feb 21 2017  4:13PM EST                       (Author)

## 2018-01-18 NOTE — MISCELLANEOUS
Assessment    1  Abnormal weight loss (783 21) (R63 4)   2  Diabetes mellitus (250 00) (E11 9)   3  Essential hypertension (401 9) (I10)   4  GERD (gastroesophageal reflux disease) (530 81) (K21 9)   5  Hypercholesteremia (272 0) (E78 00)   6  Protein-calorie malnutrition (263 9) (E46)    Plan  Diabetes mellitus    · (1) COMPREHENSIVE METABOLIC PANEL; Status:Active; Requested THI:[id number];    Perform:[facility]; YTH:[id number]; Ordered; For:Diabetes mellitus; Ordered By:Ganesh Menezes;   · (1) HEMOGLOBIN A1C; Status:Active; Requested QZX:[id number];    Perform:[facility]; TIN:[id number]; Ordered; For:Diabetes mellitus; Ordered By:Celi Menezes;  Essential hypertension    · (1) CBC/PLT/DIFF; Status:Active; Requested UGU:[id number];    Perform:[facility]; OZT:[id number]; Ordered;  For:Essential hypertension; Ordered By:Ganesh Menezes;  GERD (gastroesophageal reflux disease)    · Dexilant 60 MG Oral Capsule Delayed Release   Rx By: Kiah Sykes; Dispense: 10 Days ; #:10 Capsule Delayed Release; Refill: 0; For: GERD (gastroesophageal reflux disease); NO = N; Dispense Sample  Hypercholesteremia    · (1) LIPID PANEL FASTING W DIRECT LDL REFLEX; Status:Active; Requested  VGN:[id number];    Perform:[facility]; ADW:[id number]; Ordered;  For:Hypercholesteremia; Ordered By:Celi Menezes;   · Follow-up visit in 4 Months Evaluation and Treatment  Follow-up  Status: Hold For -  Scheduling  Requested for: 20Jan2017   Ordered; For: Hypercholesteremia; Ordered By: Kiah Sykes Performed:  Due: 37LZD6254    Discussion/Summary  Discussion Summary:   Check US of both legs  ace wraps for the swelling  elevate elgs when not standing  hospital records and pathology reviewed, adenocarcinoma noted and lymphoma tests are not back yet  labs reviewed  add enteric coated aspirin daily  check stools and food intake   check labs in 4months  make appt with dr Anabela Godfrey in 1 month to review next course of action/plan for treatment  return to our office in 4months or as needed  f/up with vascular in 6months  Chief Complaint  Chief Complaint Free Text Note Form: Pt is here for a DEJA after being hospitalized @ [facility] 12/31/16-1/9/17, dx: colonic mass, DMII, HTN, GERD, HLD, abnormal weight loss, AAA w/thrombus, enlarged prostate, exploratory lap  Pt states that he is feeling better and has no concerns  There is a question as to whether he should still be taking his multivitamin  History of Present Illness  TCM Communication [facility]: The patient is being contacted for follow-up after hospitalization and DEJA scheduled 1/20/17 @ 3 PM with FM in [place]  He was hospitalized at [facility]  The date of admission: 12/31/17, date of discharge: 1/9/17  Diagnosis: colonic mass, DM2, HTN, GERD, HLD, abnormal weight loss, AAA w/ thrombus, enlarged prostate, exploratory lap  He was discharged to home  Medications reviewed and updated today  He scheduled a follow up appointment  Follow-up appointments with other specialists: to f/u with oncology and GI  Symptoms: weakness and fatigue  The patient is currently experiencing symptoms  LLE edema Counseling was provided to patient's family  son  Communication performed and completed by sam   Abdominal Pain (Follow-Up): The patient is being seen for follow-up of abdominal pain  The patient reports doing well  Lost 110 lbs over 17 months bc he stopped eating due to abd pain and acid reflux  1/20/17: feels much better, eating small meals 6-7 times per day, having BM daily  had a long hospital course  wants pathology results  Interval symptoms:  denies abdominal pain, denies abdominal distention, denies vomiting, denies diarrhea and denies constipation  Associated symptoms: weight loss, but no hematemesis, no melena, no hematochezia, no fever, no jaundice, no hematuria and no back pain  The patient is not currently on medication for this problem  Hypertension (Follow-Up):  The patient presents for follow-up of essential hypertension  The patient states he has been doing well with his blood pressure control since the last visit  Comorbid Illnesses: DM  Interval Events: 110 lb weight loss over 1 year  takes BP meds  had SBP 80's last week and was tx with IVF in ER and d/c  records are in the chart  Symptoms:   Diabetes Type II (Follow-Up): The patient states he has been doing well with his Type II Diabetes control since the last visit  Interval Events: last AIc Oct 2016 6  2  not taking his meds due to not eating and check BS daily in am  well controlled  Symptoms:      Review of Systems  Complete-Male:   Constitutional: feeling tired, but no fever, not feeling poorly, no recent weight gain and no recent weight loss  Eyes: no eyesight problems  ENT: no nosebleeds and no nasal discharge  Cardiovascular: no chest pain  Respiratory: no shortness of breath, no cough and no wheezing  Gastrointestinal: as noted in HPI, no abdominal pain, no nausea, no vomiting, no constipation, no diarrhea and no blood in stools  Genitourinary: no dysuria and no incontinence  Musculoskeletal: no arthralgias, no joint swelling, no myalgias and no joint stiffness  Integumentary: no itching and no skin wound  Neurological: no headache, no numbness, no confusion, no dizziness and no fainting  Psychiatric: not suicidal, no anxiety, no sleep disturbances and no depression  Hematologic/Lymphatic: no tendency for easy bleeding  Active Problems    1  Abnormal weight loss (783 21) (R63 4)   2  Diabetes mellitus (250 00) (E11 9)   3  Essential hypertension (401 9) (I10)   4  Generalized abdominal pain (789 07) (R10 84)   5  GERD (gastroesophageal reflux disease) (530 81) (K21 9)   6  Hypercholesteremia (272 0) (E78 00)   7  Hypotension (458 9) (I95 9)   8  Protein-calorie malnutrition (263 9) (E46)    Past Medical History    1  History of arthritis (V13 4) (Z87 39)   2   History of cataract (V12 49) (Z86 69)   3  History of colonic polyps (V12 72) (Z86 010)   4  History of esophageal reflux (V12 79) (Z87 19)   5  History of glaucoma (V12 49) (Z86 69)   6  History of hearing loss (V12 49) (Z86 69)   7  History of hypertension (V12 59) (Z86 79)   8  History of Other urinary problems (V47 4) (N39 9)    Family History  Father    1  Family history of cardiac disorder (V17 49) (Z82 49)   2  Family history of diabetes mellitus (V18 0) (Z83 3)    Social History    · Former smoker (V15 82) (B68 260)   · No alcohol use   · No illicit drug use    Current Meds   1  AmLODIPine Besylate 10 MG Oral Tablet; TAKE 1 TABLET DAILY; Therapy: 80HVQ5098 to (Evaluate:28Jun2017) Recorded   2  Aspirin 81 MG TABS; TAKE 1 TABLET DAILY; Therapy: (Recorded:11Jan2017) to Recorded   3  Carvedilol 25 MG Oral Tablet; TAKE 1 TABLET TWICE DAILY; Therapy: 82OPL4938 to Recorded   4  Dexilant 60 MG Oral Capsule Delayed Release; TAKE 1 CAPSULE DAILY EVERY   MORNING BEFORE BREAKFAST; Therapy: 75DIH2727 to (Evaluate:09Jan2017); Last Rx:[id number] Ordered   5  Doxazosin Mesylate 4 MG Oral Tablet; Take 1 tablet daily; Therapy: 98TTH5788 to Recorded   6  Ensure Active High Protein LIQD; SWALLOW ML 4 times daily; Therapy: (Recorded:11Jan2017) to Recorded   7  Fluticasone Propionate 50 MCG/ACT Nasal Suspension; INHALE 2 SPRAY Daily; Therapy: (Recorded:11Jan2017) to Recorded   8  Latanoprost 0 005 % Ophthalmic Solution; INSTILL 1 DROP IN BOTH EYES AT   BEDTIME; Therapy: 80LHM8924 to Recorded   9  MetFORMIN HCl - 1000 MG Oral Tablet; TAKE 1 TABLET DAILY WITH FOOD; Therapy: 41GXF2800 to Recorded   10  MiraLax Oral Packet; take 1 packet daily; Therapy: (Recorded:11Jan2017) to Recorded   11  Omeprazole 20 MG Oral Capsule Delayed Release; Take 1 capsule twice daily; Therapy: (Recorded:11Jan2017) to Recorded   12  Potassium Chloride 10 MEQ TBCR; TAKE 2 TABLETS DAILY; Therapy: (Recorded:11Jan2017) to Recorded   13   Senna-Docusate Sodium 8 6-50 MG Oral Tablet; TAKE 1 TABLET TWICE DAILY; Therapy: (Recorded:11Jan2017) to Recorded    Allergies    1  No Known Drug Allergies    Vitals  Signs   Recorded: 20Jan2017 11:42AM   Temperature: 98 1 F, Oral  Heart Rate: 62  Systolic: 454, LUE, Sitting  Diastolic: 68, LUE, Sitting  Height: 6 ft 3 in  Weight: 248 lb 4 oz  BMI Calculated: 31 03  BSA Calculated: 2 41  O2 Saturation: 98, RA    Physical Exam    Constitutional   General appearance: No acute distress, well appearing and well nourished  Eyes   Conjunctiva and lids: No swelling, erythema, or discharge  Pupils and irises: Equal, round and reactive to light  Ears, Nose, Mouth, and Throat   External inspection of ears and nose: Normal     Otoscopic examination: Tympanic membrance translucent with normal light reflex  Canals patent without erythema  Nasal mucosa, septum, and turbinates: Normal without edema or erythema  Oropharynx: Abnormal   front teeth missing  mildly dry mm  Pulmonary   Respiratory effort: No increased work of breathing or signs of respiratory distress  Auscultation of lungs: Clear to auscultation, equal breath sounds bilaterally, no wheezes, no rales, no rhonci  Cardiovascular   Auscultation of heart: Normal rate and rhythm, normal S1 and S2, without murmurs  Examination of extremities for edema and/or varicosities: Abnormal   trace to + 1 edema LLE, RLE with + 2 pitting edema  new per pt  Carotid pulses: Normal     Abdomen   Abdomen: Abnormal   firmness in epigastic area, no ttp  heling surgical scar, midline  Liver and spleen: No hepatomegaly or splenomegaly  Musculoskeletal   Gait and station: Normal     Skin   Skin and subcutaneous tissue: Normal without rashes or lesions      Psychiatric   Orientation to person, place and time: Normal     Mood and affect: Normal          Signatures   Electronically signed by : Beny Aburto, ; Jan 11 2017 10:45AM EST                       (Co-author) Electronically signed by : Celia Manjarrez DO; Jan 20 2017 12:55PM EST                       (Author)

## 2018-01-22 VITALS
HEART RATE: 62 BPM | SYSTOLIC BLOOD PRESSURE: 116 MMHG | WEIGHT: 248.25 LBS | TEMPERATURE: 98.1 F | BODY MASS INDEX: 30.87 KG/M2 | DIASTOLIC BLOOD PRESSURE: 68 MMHG | HEIGHT: 75 IN | OXYGEN SATURATION: 98 %

## 2019-03-19 ENCOUNTER — TELEPHONE (OUTPATIENT)
Dept: INTERNAL MEDICINE CLINIC | Facility: CLINIC | Age: 74
End: 2019-03-19

## (undated) DEVICE — INTENDED FOR TISSUE SEPARATION, AND OTHER PROCEDURES THAT REQUIRE A SHARP SURGICAL BLADE TO PUNCTURE OR CUT.: Brand: BARD-PARKER SAFETY BLADES SIZE 11, STERILE

## (undated) DEVICE — 3M™ IOBAN™ 2 ANTIMICROBIAL INCISE DRAPE 6640EZ: Brand: IOBAN™ 2

## (undated) DEVICE — GLOVE INDICATOR PI UNDERGLOVE SZ 8 BLUE

## (undated) DEVICE — INTENDED FOR TISSUE SEPARATION, AND OTHER PROCEDURES THAT REQUIRE A SHARP SURGICAL BLADE TO PUNCTURE OR CUT.: Brand: BARD-PARKER SAFETY BLADES SIZE 15, STERILE

## (undated) DEVICE — ADHESIVE SKN CLSR HISTOACRYL FLEX 0.5ML LF

## (undated) DEVICE — BAG DECANTER

## (undated) DEVICE — STERILE ACCESS CATHETER PACK: Brand: CARDINAL HEALTH

## (undated) DEVICE — GLOVE SRG BIOGEL ECLIPSE 7.5

## (undated) DEVICE — CHLORAPREP HI-LITE 26ML ORANGE

## (undated) DEVICE — SUT MONOCRYL 4-0 PS-2 18 IN Y496G

## (undated) DEVICE — SYRINGE 10ML LL

## (undated) DEVICE — 3M™ TEGADERM™ TRANSPARENT FILM DRESSING FRAME STYLE, 1626W, 4 IN X 4-3/4 IN (10 CM X 12 CM), 50/CT 4CT/CASE: Brand: 3M™ TEGADERM™

## (undated) DEVICE — SUT PROLENE 2-0 CT-2 30 IN 8411H